# Patient Record
Sex: FEMALE | Race: WHITE | NOT HISPANIC OR LATINO | Employment: UNEMPLOYED | URBAN - METROPOLITAN AREA
[De-identification: names, ages, dates, MRNs, and addresses within clinical notes are randomized per-mention and may not be internally consistent; named-entity substitution may affect disease eponyms.]

---

## 2023-02-17 PROBLEM — G25.81 RESTLESS LEGS SYNDROME: Status: ACTIVE | Noted: 2023-02-17

## 2023-02-17 PROBLEM — G24.9 DYSKINESIA: Status: ACTIVE | Noted: 2023-02-17

## 2023-02-17 PROBLEM — G20.A1 PARKINSON DISEASE (MULTI): Status: ACTIVE | Noted: 2023-02-17

## 2023-02-17 PROBLEM — M81.0 AGE-RELATED OSTEOPOROSIS WITHOUT CURRENT PATHOLOGICAL FRACTURE: Status: ACTIVE | Noted: 2023-02-17

## 2023-02-17 PROBLEM — R11.2 NON-INTRACTABLE VOMITING WITH NAUSEA: Status: ACTIVE | Noted: 2023-02-17

## 2023-02-17 PROBLEM — R53.81 MALAISE AND FATIGUE: Status: ACTIVE | Noted: 2023-02-17

## 2023-02-17 PROBLEM — R11.0 NAUSEA IN ADULT: Status: ACTIVE | Noted: 2023-02-17

## 2023-02-17 PROBLEM — R53.83 MALAISE AND FATIGUE: Status: ACTIVE | Noted: 2023-02-17

## 2023-02-17 PROBLEM — L30.9 ECZEMA: Status: ACTIVE | Noted: 2023-02-17

## 2023-02-17 PROBLEM — G47.33 OBSTRUCTIVE SLEEP APNEA: Status: ACTIVE | Noted: 2023-02-17

## 2023-02-17 PROBLEM — M26.629 TMJ SYNDROME: Status: ACTIVE | Noted: 2023-02-17

## 2023-02-17 PROBLEM — U07.1 COVID-19 VIRUS INFECTION: Status: RESOLVED | Noted: 2023-02-17 | Resolved: 2023-02-17

## 2023-02-17 PROBLEM — R79.89 ELEVATED SERUM CREATININE: Status: ACTIVE | Noted: 2023-02-17

## 2023-02-17 PROBLEM — R49.9 UNSPECIFIED VOICE AND RESONANCE DISORDER: Status: ACTIVE | Noted: 2023-02-17

## 2023-02-17 PROBLEM — R47.1 DYSARTHRIA ON EXAMINATION: Status: ACTIVE | Noted: 2023-02-17

## 2023-02-17 PROBLEM — R05.3 CHRONIC COUGH: Status: ACTIVE | Noted: 2023-02-17

## 2023-02-17 RX ORDER — AMANTADINE HYDROCHLORIDE 100 MG/1
CAPSULE, GELATIN COATED ORAL
COMMUNITY
Start: 2020-07-08 | End: 2024-02-01 | Stop reason: WASHOUT

## 2023-02-17 RX ORDER — ROPINIROLE 8 MG/1
1 TABLET, EXTENDED RELEASE ORAL DAILY
COMMUNITY
Start: 2020-06-16 | End: 2023-04-12

## 2023-02-17 RX ORDER — CHOLECALCIFEROL (VITAMIN D3) 10(400)/ML
DROPS ORAL
COMMUNITY
Start: 2018-04-10

## 2023-02-17 RX ORDER — CALCIUM CARBONATE 300MG(750)
TABLET,CHEWABLE ORAL
COMMUNITY
Start: 2020-07-06

## 2023-02-17 RX ORDER — CARBIDOPA AND LEVODOPA 25; 100 MG/1; MG/1
TABLET ORAL
COMMUNITY
Start: 2018-04-10 | End: 2024-02-01 | Stop reason: SDUPTHER

## 2023-03-14 LAB
ALANINE AMINOTRANSFERASE (SGPT) (U/L) IN SER/PLAS: 5 U/L (ref 7–45)
ALBUMIN (G/DL) IN SER/PLAS: 4.4 G/DL (ref 3.4–5)
ALKALINE PHOSPHATASE (U/L) IN SER/PLAS: 106 U/L (ref 33–136)
ANION GAP IN SER/PLAS: 11 MMOL/L (ref 10–20)
ASPARTATE AMINOTRANSFERASE (SGOT) (U/L) IN SER/PLAS: 12 U/L (ref 9–39)
BILIRUBIN TOTAL (MG/DL) IN SER/PLAS: 0.7 MG/DL (ref 0–1.2)
CALCIUM (MG/DL) IN SER/PLAS: 10.2 MG/DL (ref 8.6–10.6)
CARBON DIOXIDE, TOTAL (MMOL/L) IN SER/PLAS: 30 MMOL/L (ref 21–32)
CHLORIDE (MMOL/L) IN SER/PLAS: 104 MMOL/L (ref 98–107)
CREATININE (MG/DL) IN SER/PLAS: 1.41 MG/DL (ref 0.5–1.05)
GFR FEMALE: 39 ML/MIN/1.73M2
GLUCOSE (MG/DL) IN SER/PLAS: 94 MG/DL (ref 74–99)
POTASSIUM (MMOL/L) IN SER/PLAS: 3.9 MMOL/L (ref 3.5–5.3)
PROTEIN TOTAL: 7.6 G/DL (ref 6.4–8.2)
SODIUM (MMOL/L) IN SER/PLAS: 141 MMOL/L (ref 136–145)
UREA NITROGEN (MG/DL) IN SER/PLAS: 24 MG/DL (ref 6–23)

## 2023-04-10 ENCOUNTER — OFFICE VISIT (OUTPATIENT)
Dept: PRIMARY CARE | Facility: CLINIC | Age: 73
End: 2023-04-10
Payer: MEDICARE

## 2023-04-10 VITALS
SYSTOLIC BLOOD PRESSURE: 130 MMHG | HEIGHT: 62 IN | HEART RATE: 82 BPM | OXYGEN SATURATION: 99 % | TEMPERATURE: 97.9 F | WEIGHT: 162 LBS | DIASTOLIC BLOOD PRESSURE: 72 MMHG | BODY MASS INDEX: 29.81 KG/M2

## 2023-04-10 DIAGNOSIS — Z12.12 SCREENING FOR COLORECTAL CANCER: ICD-10-CM

## 2023-04-10 DIAGNOSIS — N18.32 ACUTE RENAL FAILURE SUPERIMPOSED ON STAGE 3B CHRONIC KIDNEY DISEASE, UNSPECIFIED ACUTE RENAL FAILURE TYPE (MULTI): ICD-10-CM

## 2023-04-10 DIAGNOSIS — M81.0 AGE-RELATED OSTEOPOROSIS WITHOUT CURRENT PATHOLOGICAL FRACTURE: ICD-10-CM

## 2023-04-10 DIAGNOSIS — G25.81 RESTLESS LEGS SYNDROME: ICD-10-CM

## 2023-04-10 DIAGNOSIS — R79.89 ELEVATED SERUM CREATININE: ICD-10-CM

## 2023-04-10 DIAGNOSIS — R53.83 MALAISE AND FATIGUE: ICD-10-CM

## 2023-04-10 DIAGNOSIS — R11.10 DRY HEAVES: ICD-10-CM

## 2023-04-10 DIAGNOSIS — Z78.0 ASYMPTOMATIC MENOPAUSAL STATE: ICD-10-CM

## 2023-04-10 DIAGNOSIS — Z00.00 ROUTINE GENERAL MEDICAL EXAMINATION AT HEALTH CARE FACILITY: ICD-10-CM

## 2023-04-10 DIAGNOSIS — Z12.11 SCREENING FOR COLORECTAL CANCER: ICD-10-CM

## 2023-04-10 DIAGNOSIS — N17.9 ACUTE RENAL FAILURE SUPERIMPOSED ON STAGE 3B CHRONIC KIDNEY DISEASE, UNSPECIFIED ACUTE RENAL FAILURE TYPE (MULTI): ICD-10-CM

## 2023-04-10 DIAGNOSIS — G20.A1 PARKINSON DISEASE (MULTI): ICD-10-CM

## 2023-04-10 DIAGNOSIS — R53.81 MALAISE AND FATIGUE: ICD-10-CM

## 2023-04-10 DIAGNOSIS — Z00.00 MEDICARE ANNUAL WELLNESS VISIT, SUBSEQUENT: Primary | ICD-10-CM

## 2023-04-10 PROCEDURE — 1159F MED LIST DOCD IN RCRD: CPT | Performed by: FAMILY MEDICINE

## 2023-04-10 PROCEDURE — 1036F TOBACCO NON-USER: CPT | Performed by: FAMILY MEDICINE

## 2023-04-10 PROCEDURE — 99214 OFFICE O/P EST MOD 30 MIN: CPT | Performed by: FAMILY MEDICINE

## 2023-04-10 PROCEDURE — G0439 PPPS, SUBSEQ VISIT: HCPCS | Performed by: FAMILY MEDICINE

## 2023-04-10 PROCEDURE — 1160F RVW MEDS BY RX/DR IN RCRD: CPT | Performed by: FAMILY MEDICINE

## 2023-04-10 PROCEDURE — 1170F FXNL STATUS ASSESSED: CPT | Performed by: FAMILY MEDICINE

## 2023-04-10 RX ORDER — ONDANSETRON 4 MG/1
4 TABLET, FILM COATED ORAL 3 TIMES DAILY PRN
COMMUNITY
End: 2023-04-10 | Stop reason: SDUPTHER

## 2023-04-10 RX ORDER — ONDANSETRON 4 MG/1
4 TABLET, FILM COATED ORAL EVERY 12 HOURS PRN
Qty: 180 TABLET | Refills: 3 | Status: SHIPPED | OUTPATIENT
Start: 2023-04-10 | End: 2024-04-09

## 2023-04-10 ASSESSMENT — ACTIVITIES OF DAILY LIVING (ADL)
MANAGING_FINANCES: NEEDS ASSISTANCE
BATHING: NEEDS ASSISTANCE
DOING_HOUSEWORK: INDEPENDENT
DRESSING: INDEPENDENT
GROCERY_SHOPPING: INDEPENDENT
BATHING: INDEPENDENT
TAKING_MEDICATION: INDEPENDENT

## 2023-04-10 ASSESSMENT — PATIENT HEALTH QUESTIONNAIRE - PHQ9
2. FEELING DOWN, DEPRESSED OR HOPELESS: NOT AT ALL
1. LITTLE INTEREST OR PLEASURE IN DOING THINGS: NOT AT ALL
SUM OF ALL RESPONSES TO PHQ9 QUESTIONS 1 AND 2: 0

## 2023-04-10 NOTE — PROGRESS NOTES
Subjective   Reason for Visit: Nikki Person is an 72 y.o. female here for a Medicare Wellness visit.     HPI  Concern's:   Dr Amaral retired and the patient went to Dr Boswell.  Was told that there was a test to see if she truly has it.      Eyes having issues and will see somethings in vision that aren't there if in darklight.      The patient is being seen for the subsequent annual wellness visit.   Past Medical, Surgical and Family History: reviewed and updated in chart.   Interval History: Patient has not been hospitalized previously.   Medications and Supplements: Review of all medications by a prescribing practitioner or clinical pharmacist (such as prescriptions, OTCs, herbal therapies and supplements) documented in the medical record.    No, the patient is not using opioids.   Patient Self Assessment of Health Status: fair.   Tobacco use: Non-User   Alcohol use: Non-User   Illicit drug use: Non-User   Current diet: unhealthy diet, does consume adequate fluids and does not consume caffeine.   Exercise Frequency: infrequently.   Depression/Suicide Screening:  .   During the past 2 weeks, the patient has not felt down, depressed or hopeless.    During the past 2 weeks, the patient has not felt little interest or pleasure in doing things.    Hearing Impairment: none.   Cognitive Impairment: No cognitive impairment observed, patient or family reported no cognitive impairment.     Bathing: needs assistance.   Dressing: performs independently.   Walking: performs independently.   Toileting: performs independently.   Feeding: performs independently.   Personal Hygiene: performs independently.   Managing Finances: performs independently.   Shopping: performs independently.   Managing Medications: performs independently.   Housework / Basic Home Maintenance: performs independently.   Handling Transportation: dependent.   Preparing Meals: performs independently.   Using the Telephone/ Communication Devices: performs  independently.    Falls Risk Screening:. LUZ ELENA has not fallen in the last 6 months.   Fall risk factors: mobility impairment, but no polypharmacy, no sedative use, no urinary incontinence, no visual impairment, no alcohol use, no deconditioning and no cognitive impairment.   Care Plan High Risk: In addition to the low/moderate risk interventions: Low/Moderate Risk: Regular physical activity such as walking, water aerobics or rochelle chi to improve strength, balance, coordination and flexibility. Wear appropriate, sensible shoe wear. Remove fall hazards at home such as loose rugs, obstacles, use non-slip surface in bath or shower. Keep living space well lit. Use assistive devices such as cane or walker if recommended and at home use handrails on stairs, grab bars for shower or tub, raised toilet seat and seat in the shower or tub.    Advance directives:. Advanced Care Planning discussed and documented advance care plan or surrogate decision maker documented in the medical record. Patient has no living will. Patient has healthcare POA.     there are no concerns today. The patient's health since the last visit is described as good. There are no interval changes in the patient's PMH, PSH, and current medications. She has regular dental visits. She complains of vision problems. Vision care includes wearing glasses. The patient complains of blurred vision and since amantadine started. She denies hearing loss. Immunizations status: not up to date.   Lifestyle: She consumes a diverse and healthy diet. She has weight concerns. She does not exercise regularly. She does not use tobacco. She denies alcohol use.   Reproductive health: the patient is postmenopausal.   Cervical cancer screening:. patient has no history of an abnormal pap smear.   Breast cancer screening: cancer screening reviewed and updated. Screening interval recommendation: 2 years.   Colorectal Cancer Screening: Cologuard July 2020 neg. a colonoscopy was performed  within the past ten years.   Metabolic screening: lipid profile performed within the past five years.   Patient Care Team:  Dung Pineda DO as PCP - General     Review of Systems    Objective   Vitals:  There were no vitals taken for this visit.      Physical Exam  General: Patient is alert and oriented Ã--3 and appears in no acute distress. No respiratory distress.    Mouth: Normal mucosa. Moist. No erythema, exudates, tonsillar enlargement.    Neck: Normal range of motion, no masses. Thyroid is palpable and normal in size without any nodules. No anterior cervical or posterior cervical adenopathy.    Heart: Regular rate and rhythm, no murmurs clicks or gallops    Lungs: Clear to auscultation bilaterally without any rhonchi rales or wheezing, lung sounds heard throughout all lung fields    Musculoskeletal: Normal range of motion, strength is grossly intact in the proximal distal muscles of the upper and lower extremities bilaterally, deep tendon reflexes +2 out of 4 and symmetric bilaterally at the patella, Achilles, biceps, triceps, sensation intact.    Nerves: Cranial nerves II through XII appear grossly intact and without deficit    Skin: Multiple excoriation marks occurring only on the lower extremities.    Psych: Normal affect. Denies suicidal or homicidal ideations.   Assessment/Plan   Problem List Items Addressed This Visit    None    Reviewed in for the patient's past medical history, surgical history, family history, social history  We reviewed the patient's activities of daily living and possible risk for falling. Patient is stable.  Discussed preventative screenings  Vaccinations were discussed in the office. We did review the patient's status on influenza vaccination, pneumonia vaccination, tetanus vaccination, and goes vaccination  Patient was instructed to follow-up with dentist regularly and also with eye doctor regularly  We discussed advanced directives including power of , living well and  DO NOT RESUSCITATE orders    Parkinson's with Dyskinesia  - Stable and following with Dr Boswell   - Wants a second opinion because she was told that that this may not be Parkinson's.      IAIN  - No CPAP due to dry eyes  - Better sleeping on side  - Trying to loose weight    RLS- controlled  - Ropirinole ER 8 mg    Osteoporosis  - Have not had any fractures  - Suggested Matt Chi for weight bearing exercise   - Repeat Bone density  - Taking Calcium/Vit D K 2     Labs ordered

## 2023-04-10 NOTE — ADDENDUM NOTE
Addended by: SHAYLA SHELL on: 4/10/2023 02:58 PM     Modules accepted: Orders     Patient was given vistaril 50 mg po for feeling anxious with fair results obtained. Voiced complaints of nausea was given ginger ale and crackers. Will continue to monitor patient.

## 2023-04-12 DIAGNOSIS — G25.81 RESTLESS LEGS SYNDROME: Primary | ICD-10-CM

## 2023-04-12 RX ORDER — ROPINIROLE 8 MG/1
TABLET, EXTENDED RELEASE ORAL
Qty: 90 TABLET | Refills: 3 | Status: SHIPPED | OUTPATIENT
Start: 2023-04-12 | End: 2023-10-24

## 2023-04-24 ENCOUNTER — TELEPHONE (OUTPATIENT)
Dept: PRIMARY CARE | Facility: CLINIC | Age: 73
End: 2023-04-24
Payer: MEDICARE

## 2023-04-24 NOTE — TELEPHONE ENCOUNTER
Pt left message that she is having dexascan on may 1st. What supplements is she supposed to stop and for how long?

## 2023-05-22 ENCOUNTER — TELEPHONE (OUTPATIENT)
Dept: PRIMARY CARE | Facility: CLINIC | Age: 73
End: 2023-05-22
Payer: MEDICARE

## 2023-05-22 NOTE — TELEPHONE ENCOUNTER
----- Message from Dung Pineda DO sent at 5/18/2023  8:07 PM EDT -----  Please let the patient know taht the bone density was normal

## 2023-08-03 ENCOUNTER — TELEPHONE (OUTPATIENT)
Dept: PRIMARY CARE | Facility: CLINIC | Age: 73
End: 2023-08-03
Payer: MEDICARE

## 2023-08-03 LAB — NONINV COLON CA DNA+OCC BLD SCRN STL QL: NEGATIVE

## 2023-08-03 NOTE — TELEPHONE ENCOUNTER
----- Message from Dung Pineda DO sent at 8/3/2023  6:39 AM EDT -----  Let patient know that cologuard was neg

## 2023-09-22 ENCOUNTER — LAB (OUTPATIENT)
Dept: LAB | Facility: LAB | Age: 73
End: 2023-09-22
Payer: MEDICARE

## 2023-09-22 DIAGNOSIS — R53.81 MALAISE AND FATIGUE: ICD-10-CM

## 2023-09-22 DIAGNOSIS — M81.0 AGE-RELATED OSTEOPOROSIS WITHOUT CURRENT PATHOLOGICAL FRACTURE: ICD-10-CM

## 2023-09-22 DIAGNOSIS — R53.83 MALAISE AND FATIGUE: ICD-10-CM

## 2023-09-22 LAB
ALANINE AMINOTRANSFERASE (SGPT) (U/L) IN SER/PLAS: 4 U/L (ref 7–45)
ALBUMIN (G/DL) IN SER/PLAS: 4.5 G/DL (ref 3.4–5)
ALKALINE PHOSPHATASE (U/L) IN SER/PLAS: 116 U/L (ref 33–136)
ANION GAP IN SER/PLAS: 13 MMOL/L (ref 10–20)
ASPARTATE AMINOTRANSFERASE (SGOT) (U/L) IN SER/PLAS: 12 U/L (ref 9–39)
BASOPHILS (10*3/UL) IN BLOOD BY AUTOMATED COUNT: 0.07 X10E9/L (ref 0–0.1)
BASOPHILS/100 LEUKOCYTES IN BLOOD BY AUTOMATED COUNT: 1.2 % (ref 0–2)
BILIRUBIN TOTAL (MG/DL) IN SER/PLAS: 0.7 MG/DL (ref 0–1.2)
CALCIUM (MG/DL) IN SER/PLAS: 10 MG/DL (ref 8.6–10.3)
CARBON DIOXIDE, TOTAL (MMOL/L) IN SER/PLAS: 28 MMOL/L (ref 21–32)
CHLORIDE (MMOL/L) IN SER/PLAS: 104 MMOL/L (ref 98–107)
COBALAMIN (VITAMIN B12) (PG/ML) IN SER/PLAS: 357 PG/ML (ref 211–911)
CREATININE (MG/DL) IN SER/PLAS: 1.41 MG/DL (ref 0.5–1.05)
EOSINOPHILS (10*3/UL) IN BLOOD BY AUTOMATED COUNT: 0.19 X10E9/L (ref 0–0.4)
EOSINOPHILS/100 LEUKOCYTES IN BLOOD BY AUTOMATED COUNT: 3.3 % (ref 0–6)
ERYTHROCYTE DISTRIBUTION WIDTH (RATIO) BY AUTOMATED COUNT: 13.3 % (ref 11.5–14.5)
ERYTHROCYTE MEAN CORPUSCULAR HEMOGLOBIN CONCENTRATION (G/DL) BY AUTOMATED: 31.4 G/DL (ref 32–36)
ERYTHROCYTE MEAN CORPUSCULAR VOLUME (FL) BY AUTOMATED COUNT: 91 FL (ref 80–100)
ERYTHROCYTES (10*6/UL) IN BLOOD BY AUTOMATED COUNT: 4.67 X10E12/L (ref 4–5.2)
GFR FEMALE: 39 ML/MIN/1.73M2
GLUCOSE (MG/DL) IN SER/PLAS: 104 MG/DL (ref 74–99)
HEMATOCRIT (%) IN BLOOD BY AUTOMATED COUNT: 42.7 % (ref 36–46)
HEMOGLOBIN (G/DL) IN BLOOD: 13.4 G/DL (ref 12–16)
IMMATURE GRANULOCYTES/100 LEUKOCYTES IN BLOOD BY AUTOMATED COUNT: 0.2 % (ref 0–0.9)
LEUKOCYTES (10*3/UL) IN BLOOD BY AUTOMATED COUNT: 5.7 X10E9/L (ref 4.4–11.3)
LYMPHOCYTES (10*3/UL) IN BLOOD BY AUTOMATED COUNT: 1.49 X10E9/L (ref 0.8–3)
LYMPHOCYTES/100 LEUKOCYTES IN BLOOD BY AUTOMATED COUNT: 26.1 % (ref 13–44)
MONOCYTES (10*3/UL) IN BLOOD BY AUTOMATED COUNT: 0.47 X10E9/L (ref 0.05–0.8)
MONOCYTES/100 LEUKOCYTES IN BLOOD BY AUTOMATED COUNT: 8.2 % (ref 2–10)
NEUTROPHILS (10*3/UL) IN BLOOD BY AUTOMATED COUNT: 3.47 X10E9/L (ref 1.6–5.5)
NEUTROPHILS/100 LEUKOCYTES IN BLOOD BY AUTOMATED COUNT: 61 % (ref 40–80)
PLATELETS (10*3/UL) IN BLOOD AUTOMATED COUNT: 294 X10E9/L (ref 150–450)
POTASSIUM (MMOL/L) IN SER/PLAS: 3.9 MMOL/L (ref 3.5–5.3)
PROTEIN TOTAL: 7.2 G/DL (ref 6.4–8.2)
SODIUM (MMOL/L) IN SER/PLAS: 141 MMOL/L (ref 136–145)
THYROTROPIN (MIU/L) IN SER/PLAS BY DETECTION LIMIT <= 0.05 MIU/L: 2.09 MIU/L (ref 0.44–3.98)
THYROXINE (T4) FREE (NG/DL) IN SER/PLAS: 0.87 NG/DL (ref 0.61–1.12)
UREA NITROGEN (MG/DL) IN SER/PLAS: 27 MG/DL (ref 6–23)

## 2023-09-22 PROCEDURE — 84439 ASSAY OF FREE THYROXINE: CPT

## 2023-09-22 PROCEDURE — 84481 FREE ASSAY (FT-3): CPT

## 2023-09-22 PROCEDURE — 85025 COMPLETE CBC W/AUTO DIFF WBC: CPT

## 2023-09-22 PROCEDURE — 80053 COMPREHEN METABOLIC PANEL: CPT

## 2023-09-22 PROCEDURE — 82607 VITAMIN B-12: CPT

## 2023-09-22 PROCEDURE — 84443 ASSAY THYROID STIM HORMONE: CPT

## 2023-09-22 PROCEDURE — 82652 VIT D 1 25-DIHYDROXY: CPT

## 2023-09-22 PROCEDURE — 36415 COLL VENOUS BLD VENIPUNCTURE: CPT

## 2023-09-23 LAB — TRIIODOTHYRONINE (T3) FREE (PG/ML) IN SER/PLAS: 3.2 PG/ML (ref 2.3–4.2)

## 2023-09-25 LAB — VITAMIN D 1,25-DIHYDROXY: 46.5 PG/ML (ref 19.9–79.3)

## 2023-09-30 PROBLEM — R11.10 DRY HEAVES: Status: ACTIVE | Noted: 2023-09-30

## 2023-09-30 PROBLEM — G47.69 SLEEP-RELATED MOVEMENT DISORDER: Status: ACTIVE | Noted: 2023-09-30

## 2023-09-30 PROBLEM — Z74.09 IMPAIRED FUNCTIONAL MOBILITY, BALANCE, GAIT, AND ENDURANCE: Status: ACTIVE | Noted: 2023-09-30

## 2023-09-30 RX ORDER — ROPINIROLE 2 MG/1
8 TABLET, FILM COATED ORAL NIGHTLY
COMMUNITY
End: 2023-10-09 | Stop reason: ALTCHOICE

## 2023-09-30 RX ORDER — QUETIAPINE FUMARATE 25 MG/1
0.5 TABLET, FILM COATED ORAL EVERY EVENING
COMMUNITY
Start: 2023-09-21 | End: 2024-02-01 | Stop reason: WASHOUT

## 2023-10-02 ENCOUNTER — TELEPHONE (OUTPATIENT)
Dept: NEUROLOGY | Facility: CLINIC | Age: 73
End: 2023-10-02
Payer: MEDICARE

## 2023-10-02 NOTE — TELEPHONE ENCOUNTER
Patient calling stating she has decided not to take Seroquel because of all the bad possible side effects, states she knows her hallucinations are not really there.    Please advise

## 2023-10-03 ENCOUNTER — APPOINTMENT (OUTPATIENT)
Dept: PHYSICAL THERAPY | Facility: CLINIC | Age: 73
End: 2023-10-03
Payer: MEDICARE

## 2023-10-09 ENCOUNTER — OFFICE VISIT (OUTPATIENT)
Dept: PRIMARY CARE | Facility: CLINIC | Age: 73
End: 2023-10-09
Payer: MEDICARE

## 2023-10-09 ENCOUNTER — TELEPHONE (OUTPATIENT)
Dept: PRIMARY CARE | Facility: CLINIC | Age: 73
End: 2023-10-09

## 2023-10-09 VITALS
SYSTOLIC BLOOD PRESSURE: 126 MMHG | TEMPERATURE: 97.8 F | BODY MASS INDEX: 28.71 KG/M2 | HEIGHT: 62 IN | DIASTOLIC BLOOD PRESSURE: 88 MMHG | WEIGHT: 156 LBS

## 2023-10-09 DIAGNOSIS — R35.0 URINARY FREQUENCY: ICD-10-CM

## 2023-10-09 DIAGNOSIS — G25.81 RESTLESS LEGS SYNDROME: ICD-10-CM

## 2023-10-09 DIAGNOSIS — M81.0 AGE-RELATED OSTEOPOROSIS WITHOUT CURRENT PATHOLOGICAL FRACTURE: ICD-10-CM

## 2023-10-09 DIAGNOSIS — Z00.00 ANNUAL PHYSICAL EXAM: Primary | ICD-10-CM

## 2023-10-09 DIAGNOSIS — N18.32 STAGE 3B CHRONIC KIDNEY DISEASE (MULTI): ICD-10-CM

## 2023-10-09 DIAGNOSIS — G47.33 OBSTRUCTIVE SLEEP APNEA: ICD-10-CM

## 2023-10-09 DIAGNOSIS — G20.B2 PARKINSON'S DISEASE WITH DYSKINESIA AND FLUCTUATING MANIFESTATIONS (MULTI): ICD-10-CM

## 2023-10-09 LAB
POC ALBUMIN /CREATININE RATIO MANUALLY ENTERED: ABNORMAL UG/MG CREAT
POC APPEARANCE, URINE: ABNORMAL
POC BILIRUBIN, URINE: NEGATIVE
POC BLOOD, URINE: NEGATIVE
POC COLOR, URINE: YELLOW
POC GLUCOSE, URINE: NEGATIVE MG/DL
POC KETONES, URINE: ABNORMAL MG/DL
POC LEUKOCYTES, URINE: ABNORMAL
POC NITRITE,URINE: NEGATIVE
POC PH, URINE: 6.5 PH
POC PROTEIN, URINE: NEGATIVE MG/DL
POC SPECIFIC GRAVITY, URINE: >=1.03
POC URINE ALBUMIN: 150 MG/L
POC URINE CREATININE: 300 MG/DL
POC UROBILINOGEN, URINE: 0.2 EU/DL

## 2023-10-09 PROCEDURE — 82044 UR ALBUMIN SEMIQUANTITATIVE: CPT | Performed by: FAMILY MEDICINE

## 2023-10-09 PROCEDURE — 87086 URINE CULTURE/COLONY COUNT: CPT

## 2023-10-09 PROCEDURE — 99397 PER PM REEVAL EST PAT 65+ YR: CPT | Performed by: FAMILY MEDICINE

## 2023-10-09 PROCEDURE — 99214 OFFICE O/P EST MOD 30 MIN: CPT | Performed by: FAMILY MEDICINE

## 2023-10-09 PROCEDURE — 1160F RVW MEDS BY RX/DR IN RCRD: CPT | Performed by: FAMILY MEDICINE

## 2023-10-09 PROCEDURE — 1036F TOBACCO NON-USER: CPT | Performed by: FAMILY MEDICINE

## 2023-10-09 PROCEDURE — 81002 URINALYSIS NONAUTO W/O SCOPE: CPT | Performed by: FAMILY MEDICINE

## 2023-10-09 PROCEDURE — 1159F MED LIST DOCD IN RCRD: CPT | Performed by: FAMILY MEDICINE

## 2023-10-09 ASSESSMENT — PATIENT HEALTH QUESTIONNAIRE - PHQ9
1. LITTLE INTEREST OR PLEASURE IN DOING THINGS: NOT AT ALL
SUM OF ALL RESPONSES TO PHQ9 QUESTIONS 1 AND 2: 0
2. FEELING DOWN, DEPRESSED OR HOPELESS: NOT AT ALL

## 2023-10-09 NOTE — TELEPHONE ENCOUNTER
Patient is on requip 8 mg daily for restless legs. It does not seem to be working now. What do you suggest?

## 2023-10-09 NOTE — PROGRESS NOTES
"Subjective   Reason for Visit: Nikki Person is an 73 y.o. female here for a Medicare Wellness visit.     HPI  Concern's:   Dr Page placed on Seroquel for auditory and visual hallucinations.  She is worried about side effects.     Has fallen 3-4 times.  Has injured the left knee.  She fell 3-4 weeks ago.  Having it not heal well.  She has a bump but denies redness, bleeding, pain is 2-3/10.      The patient's health since the last visit is described as good. There are no interval changes in the patient's PMH, PSH, and current medications. She has regular dental visits. She complains of vision problems. Vision care includes wearing glasses.  She denies hearing loss. Immunizations status: not up to date and refuses vaccinations.   Lifestyle: She consumes a diverse and healthy diet. She has weight concerns. And is losing.  Jhas decreased portion sizes.  She does not exercise regularly. She does not use tobacco. She denies alcohol use.   Reproductive health: the patient is postmenopausal.   Cervical cancer screening:. patient has no history of an abnormal pap smear.   Breast cancer screenin2022 normal. Screening interval recommendation: 2 years.   Colorectal Cancer Screening: Cologuard 2023 negMetabolic screening: lipid profile performed within the past five years.   Patient Care Team:  Dung Pineda DO as PCP - General  Dung Pineda DO as PCP - [employer] PCP     Review of Systems    Objective   Vitals:  /88   Temp 36.6 °C (97.8 °F)   Ht 1.575 m (5' 2\")   Wt 70.8 kg (156 lb)   BMI 28.53 kg/m²       Physical Exam  General: Patient is alert and oriented Ã--3 and appears in no acute distress. No respiratory distress.    Mouth: Normal mucosa. Moist. No erythema, exudates, tonsillar enlargement.    Neck: Normal range of motion, no masses. Thyroid is palpable and normal in size without any nodules. No anterior cervical or posterior cervical adenopathy.    Heart: Regular rate and " rhythm, no murmurs clicks or gallops    Lungs: Clear to auscultation bilaterally without any rhonchi rales or wheezing, lung sounds heard throughout all lung fields    Musculoskeletal: Normal range of motion, strength is grossly intact in the proximal distal muscles of the upper and lower extremities bilaterally, deep tendon reflexes +2 out of 4 and symmetric bilaterally at the patella, Achilles, biceps, triceps, sensation intact.    Nerves: Cranial nerves II through XII appear grossly intact and without deficit    Skin: Multiple excoriation marks occurring only on the lower extremities.    Psych: Normal affect. Denies suicidal or homicidal ideations.   Assessment/Plan   Problem List Items Addressed This Visit       Age-related osteoporosis without current pathological fracture    Relevant Orders    CBC and Auto Differential    Comprehensive Metabolic Panel    Lipid Panel    Vitamin B12    Obstructive sleep apnea    Parkinson disease    Restless legs syndrome    Relevant Orders    CBC and Auto Differential    Comprehensive Metabolic Panel    Lipid Panel    Vitamin B12     Other Visit Diagnoses       Annual physical exam    -  Primary    Stage 3b chronic kidney disease (CMS/HCC)        Relevant Orders    POCT Albumin random urine manually resulted    CBC and Auto Differential    Comprehensive Metabolic Panel    Lipid Panel    Vitamin B12          Reviewed in for the patient's past medical history, surgical history, family history, social history  We reviewed the patient's activities of daily living and possible risk for falling. Patient is stable.  Discussed preventative screenings  Vaccinations were discussed in the office. We did review the patient's status on influenza vaccination, pneumonia vaccination, tetanus vaccination, and goes vaccination  Patient was instructed to follow-up with dentist regularly and also with eye doctor regularly  We discussed advanced directives including power of , living well  and DO NOT RESUSCITATE orders    Parkinson's with Dyskinesia  - Stable and following with Dr Page   - Discussed starting the seroquel for the hallucinations  -  Starting PT again    IAIN  - No CPAP due to dry eyes  - Better sleeping on side  - Trying to loose weight    RLS- controlled  - Ropirinole ER 8 mg    Osteoporosis  - Have not had any fractures  - Suggested Matt Chi for weight bearing exercise   - Repeat Bone density  - Taking Calcium/Vit D K 2    CKD stage 3B  - Urine microalbumin done in the office and was abnormal with small amount of albumin noted  - US kidneys ordered  -Discussed possible medication to help out with the kidneys      Labs ordered

## 2023-10-10 LAB — BACTERIA UR CULT: NORMAL

## 2023-10-11 DIAGNOSIS — G25.81 RESTLESS LEGS SYNDROME: Primary | ICD-10-CM

## 2023-10-11 DIAGNOSIS — D50.9 IRON DEFICIENCY ANEMIA, UNSPECIFIED IRON DEFICIENCY ANEMIA TYPE: ICD-10-CM

## 2023-10-12 ENCOUNTER — EVALUATION (OUTPATIENT)
Dept: PHYSICAL THERAPY | Facility: HOSPITAL | Age: 73
End: 2023-10-12
Payer: MEDICARE

## 2023-10-12 DIAGNOSIS — G20.A1 PARKINSON'S DISEASE (MULTI): ICD-10-CM

## 2023-10-12 DIAGNOSIS — R29.6 FALLS FREQUENTLY: Primary | ICD-10-CM

## 2023-10-12 PROCEDURE — 97162 PT EVAL MOD COMPLEX 30 MIN: CPT | Mod: GP

## 2023-10-12 ASSESSMENT — ENCOUNTER SYMPTOMS
DEPRESSION: 0
OCCASIONAL FEELINGS OF UNSTEADINESS: 1
LOSS OF SENSATION IN FEET: 0

## 2023-10-12 ASSESSMENT — PAIN SCALES - GENERAL: PAINLEVEL_OUTOF10: 0 - NO PAIN

## 2023-10-12 ASSESSMENT — PATIENT HEALTH QUESTIONNAIRE - PHQ9
SUM OF ALL RESPONSES TO PHQ9 QUESTIONS 1 AND 2: 0
2. FEELING DOWN, DEPRESSED OR HOPELESS: NOT AT ALL
1. LITTLE INTEREST OR PLEASURE IN DOING THINGS: NOT AT ALL

## 2023-10-12 ASSESSMENT — PAIN - FUNCTIONAL ASSESSMENT: PAIN_FUNCTIONAL_ASSESSMENT: 0-10

## 2023-10-12 NOTE — PROGRESS NOTES
Physical Therapy    Physical Therapy Evaluation and Treatment      Patient Name: Nikki Person  MRN: [id number]  Today's Date: 10/12/2023  Time Calculation  Start Time: 1345  Stop Time: 1445  Time Calculation (min): 60 min        PT Assessment Results: Impaired balance, Decreased endurance, Decreased mobility, Decreased coordination, Decreased safety awareness  Rehab Prognosis: Good  Evaluation/Treatment Tolerance: Patient tolerated treatment well      Current Problem:   1. Falls frequently  Follow Up In Physical Therapy      2. Parkinson's disease  Referral to Physical Therapy    Follow Up In Physical Therapy          Subjective    General:  General  Reason for Referral: Hx of falls, impaired mobility  Referred By: Camilo  Past Medical History Relevant to Rehab: PD, Dyskinesia  Pt dx with Parkinson's in 2008.  Pt is a 74 yo female with h/o parkinson, demonstrating dyskinesia. Pt states that she didn't have dyskinesia until until a week ago, states  that Dr Page wanted her to discontinue her current PD medications because of mild hallucinations.  Pt reports that she went off for one day resulting in a significant increase in involuntary movements, has improved since going back on the meds but not back to where it was prior to change.    Precautions:  Precautions  STEADI Fall Risk Score (The score of 4 or more indicates an increased risk of falling): 6       Pain:  Pain Assessment  Pain Assessment: 0-10  Pain Score: 0 - No pain     Prior Level of Function:     Ambulated independently without assistive device  Objective   Sensation:      Intact and symmetrical to light touch in LE's    Strength:  LE strength grossly 4+/5 in LE's  ROM:     LE's WFL's      Gait:   Pt ambulates with modified independence using tri-wheeled walker, slow pace, decreased step length and foot clearance- scuffing-shuffled steps  Outcome Measures:  Finish Cronin Next visit    Treatments:  EXERCISES Date: 10/12 Date  Date Date   VISIT# #1 # # #     REPS REPS REPS REPS          Nu Step              RB Calf Stretch              Parallel Bars:         Lateral          Retro         March         Big-Fast walk               Cane step fwd/back(lunge )        Alt toe taps on step                                                                                                                               Cronin or Mini Best next      HEP Issue next         OP EDUCATION:  Education  Individual(s) Educated: Patient  Education Provided: POC, Fall Risk  Risk and Benefits Discussed with Patient/Caregiver/Other: yes  Patient/Caregiver Demonstrated Understanding: yes  Plan of Care Discussed and Agreed Upon: yes  Patient Response to Education: Patient/Caregiver Verbalized Understanding of Information  Education on POC, LSVT and goals with treatment of PD    Assessment:  PT Assessment  PT Assessment Results: Impaired balance, Decreased endurance, Decreased mobility, Decreased coordination, Decreased safety awareness  Rehab Prognosis: Good  Evaluation/Treatment Tolerance: Patient tolerated treatment well  Plan:  OP PT Plan  Treatment/Interventions: Education/ Instruction, Gait training, Neuromuscular re-education, Therapeutic activities, Therapeutic exercises  PT Plan: Skilled PT  PT Frequency: 2 times per week  Duration: 6 weeks  Onset Date: 09/22/23  Rehab Potential: Good  Plan of Care Agreement: Patient     Goals:  Active       Impaired Mobility       STG- HEP       Start:  10/16/23    Expected End:  11/06/23       Pt will demonstrate independence with HEP to reinforce gains made in treatment          STG-Balance       Start:  10/16/23    Expected End:  11/06/23       Finish Cronin  Balance Assessment and set appropriate balance goals         LTG- Gait       Start:  10/16/23    Expected End:  11/27/23       Pt will ambulate using wheeled walker with increased pace, requiring minimal vc's for foot clearance on hard level surfaces x150' for safely entering/exiting appointments          PT Goal 4       Start:  10/16/23

## 2023-10-12 NOTE — LETTER
October 16, 2023     Patient: Nikki Person   YOB: 1950   Date of Visit: 10/12/2023       To Whom it May Concern:    Nikki Person was seen in my clinic on 10/12/2023. She {Return to school/sport:59531}.    If you have any questions or concerns, please don't hesitate to call.         Sincerely,          Lori Ambriz, PT        CC: No Recipients

## 2023-10-12 NOTE — LETTER
October 16, 2023     Patient: Nikki Person   YOB: 1950   Date of Visit: 10/12/2023       To Whom It May Concern:    It is my medical opinion that Nikki Person {Work release (duty restriction):20660}.    If you have any questions or concerns, please don't hesitate to call.         Sincerely,        Lori Ambriz, PT    CC: No Recipients

## 2023-10-16 PROBLEM — R29.6 FALLS FREQUENTLY: Status: RESOLVED | Noted: 2023-10-12 | Resolved: 2023-10-16

## 2023-10-17 ENCOUNTER — TREATMENT (OUTPATIENT)
Dept: PHYSICAL THERAPY | Facility: HOSPITAL | Age: 73
End: 2023-10-17
Payer: MEDICARE

## 2023-10-17 DIAGNOSIS — R29.6 FALLS FREQUENTLY: Primary | ICD-10-CM

## 2023-10-17 DIAGNOSIS — G20.A1 PARKINSON'S DISEASE (MULTI): ICD-10-CM

## 2023-10-17 PROCEDURE — 97110 THERAPEUTIC EXERCISES: CPT | Mod: GP,CQ

## 2023-10-17 ASSESSMENT — PAIN - FUNCTIONAL ASSESSMENT: PAIN_FUNCTIONAL_ASSESSMENT: 0-10

## 2023-10-17 ASSESSMENT — PAIN SCALES - GENERAL: PAINLEVEL_OUTOF10: 0 - NO PAIN

## 2023-10-17 NOTE — PROGRESS NOTES
"Physical Therapy    Physical Therapy Treatment    Patient Name: Nikki Person  MRN: [id number]  Today's Date: 10/17/2023  Time Calculation  Start Time: 0215  Stop Time: 0300  Time Calculation (min): 45 min  Visit #2    Current Problem  1. Parkinson's disease  Follow Up In Physical Therapy      2. Falls frequently  Follow Up In Physical Therapy          Subjective:  Subjective   Patient reported she occasionally has right shoulder pain but it is not bothering her today. She noted no falls. Pt. Noted she normally only uses assistive device when at home alone.       Precautions  Precautions  Precautions Comment: fall risk     Pain  Pain Assessment: 0-10  Pain Score: 0 - No pain    Objective    Pt. Ambulated into clinic without AD  Outcome Measures:   Cronin mild balance impairment      Treatments:    EXERCISES Date: 10/12 Date  Date Date   VISIT# #2 # # #    REPS REPS REPS REPS    8' l1      Nu Step              RB Calf Stretch 30\"x3             Parallel Bars:         Lateral  2 laps        Retro 2 laps        March         Big-Fast walk 2 laps              Cane step fwd/back(lunge )        Alt toe taps on step                                                                                                                              Cronin completed          Assessment: Pt. Needed cues to slow down on NuStep. Patient completed with jerking motion, stopped at 8' due to fatigue. During Cronin testing, pt completed step taps at accelerated rate that was unsafe, multiple cues given and SBA to slow down motion. Verbal cues for big steps with big walk but to slow down for safety. Pt needed SBA and cues to lessen speed for retro walking.       Plan:   Improve balance.    OP EDUCATION:       Goals:  Active       Impaired Mobility       STG- HEP       Start:  10/16/23    Expected End:  23       Pt will demonstrate independence with HEP to reinforce gains made in treatment          STG-Balance       Start:  10/16/23    " Expected End:  11/06/23       Finish Cronin  Balance Assessment and set appropriate balance goals         LTG- Gait       Start:  10/16/23    Expected End:  11/27/23       Pt will ambulate using wheeled walker with increased pace, requiring minimal vc's for foot clearance on hard level surfaces x150' for safely entering/exiting appointments         PT Goal 4       Start:  10/16/23

## 2023-10-23 ENCOUNTER — DOCUMENTATION (OUTPATIENT)
Dept: PHYSICAL THERAPY | Facility: HOSPITAL | Age: 73
End: 2023-10-23
Payer: MEDICARE

## 2023-10-23 ENCOUNTER — APPOINTMENT (OUTPATIENT)
Dept: PHYSICAL THERAPY | Facility: HOSPITAL | Age: 73
End: 2023-10-23
Payer: MEDICARE

## 2023-10-24 ENCOUNTER — DOCUMENTATION (OUTPATIENT)
Dept: PHYSICAL THERAPY | Facility: HOSPITAL | Age: 73
End: 2023-10-24

## 2023-10-24 ENCOUNTER — OFFICE VISIT (OUTPATIENT)
Dept: PRIMARY CARE | Facility: CLINIC | Age: 73
End: 2023-10-24
Payer: MEDICARE

## 2023-10-24 VITALS
SYSTOLIC BLOOD PRESSURE: 138 MMHG | WEIGHT: 156 LBS | HEIGHT: 62 IN | DIASTOLIC BLOOD PRESSURE: 82 MMHG | RESPIRATION RATE: 16 BRPM | BODY MASS INDEX: 28.71 KG/M2 | HEART RATE: 81 BPM | OXYGEN SATURATION: 96 %

## 2023-10-24 DIAGNOSIS — M54.6 DORSALGIA OF THORACIC REGION: Primary | ICD-10-CM

## 2023-10-24 DIAGNOSIS — M25.511 CHRONIC RIGHT SHOULDER PAIN: ICD-10-CM

## 2023-10-24 DIAGNOSIS — R07.81 RIB PAIN ON LEFT SIDE: ICD-10-CM
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
## 2023-10-24 DIAGNOSIS — G89.29 CHRONIC RIGHT SHOULDER PAIN: ICD-10-CM

## 2023-10-24 DIAGNOSIS — G20.B2 PARKINSON'S DISEASE WITH DYSKINESIA AND FLUCTUATING MANIFESTATIONS (MULTI): ICD-10-CM

## 2023-10-24 DIAGNOSIS — G25.81 RESTLESS LEGS SYNDROME: ICD-10-CM

## 2023-10-24 PROCEDURE — 1160F RVW MEDS BY RX/DR IN RCRD: CPT | Performed by: FAMILY MEDICINE

## 2023-10-24 PROCEDURE — 1036F TOBACCO NON-USER: CPT | Performed by: FAMILY MEDICINE

## 2023-10-24 PROCEDURE — 1159F MED LIST DOCD IN RCRD: CPT | Performed by: FAMILY MEDICINE

## 2023-10-24 PROCEDURE — 1126F AMNT PAIN NOTED NONE PRSNT: CPT | Performed by: FAMILY MEDICINE

## 2023-10-24 PROCEDURE — 99214 OFFICE O/P EST MOD 30 MIN: CPT | Performed by: FAMILY MEDICINE

## 2023-10-24 RX ORDER — PRAMIPEXOLE 1.5 MG/1
1.5 TABLET, EXTENDED RELEASE ORAL NIGHTLY
Qty: 30 TABLET | Refills: 3 | Status: SHIPPED | OUTPATIENT
Start: 2023-10-24 | End: 2024-02-01 | Stop reason: WASHOUT

## 2023-10-24 RX ORDER — NAPROXEN 500 MG/1
500 TABLET ORAL 2 TIMES DAILY PRN
Qty: 60 TABLET | Refills: 5 | Status: SHIPPED | OUTPATIENT
Start: 2023-10-24 | End: 2024-06-20

## 2023-10-24 RX ORDER — CYCLOBENZAPRINE HCL 10 MG
10 TABLET ORAL 3 TIMES DAILY PRN
Qty: 30 TABLET | Refills: 0 | Status: SHIPPED | OUTPATIENT
Start: 2023-10-24 | End: 2023-10-27 | Stop reason: SDUPTHER

## 2023-10-24 NOTE — PROGRESS NOTES
Physical Therapy                 Therapy Communication Note    Patient Name: Nikki Person  MRN: [id number]  Today's Date: 10/24/2023     Discipline: Physical Therapy    Missed Visit Reason:      Missed Time: Cancel    Comment: Pt. fell Friday evening and doesn't think she can complete exercises.

## 2023-10-24 NOTE — PROGRESS NOTES
Subjective   Patient ID: Nikki Person is a 73 y.o. female who presents for Fall.  LUIS ARMANDO Noble has 3 falls.  She fell in the bathroom this Friday.  She hit her left side. She had a couple bruises but did not feel like much.  Sunday she fell when she went to get her walker.  She has had spasms in the back since and is in pain.  She is taking naproxen and did not get any relief yet.  Slept in the recliner.        Review of Systems    Objective   Physical Exam  General: The patient is alert and oriented and appears in some pain distress    Neck: Decreased range of motion and but no adenopathy    Heart: Regular rate and rhythm no murmurs clicks or gallops    Lungs: Clear to auscultation bilateral without rhonchi rales or wheezing    Musculoskeletal: Strength is grossly intact throughout    Patient has bruising along the left breast and left ribs  Assessment/Plan   Problem List Items Addressed This Visit       Parkinson's disease    Relevant Medications    pramipexole (Mirapex ER) 1.5 mg tablet extended release 24 hr    Restless legs syndrome    Relevant Medications    pramipexole (Mirapex ER) 1.5 mg tablet extended release 24 hr     Other Visit Diagnoses       Dorsalgia of thoracic region    -  Primary    Relevant Medications    naproxen (Naprosyn) 500 mg tablet    cyclobenzaprine (Flexeril) 10 mg tablet    Fall, initial encounter        Relevant Medications    naproxen (Naprosyn) 500 mg tablet    cyclobenzaprine (Flexeril) 10 mg tablet    Chronic right shoulder pain

## 2023-10-24 NOTE — PROGRESS NOTES
Physical Therapy                 Therapy Communication Note    Patient Name: Nikki Person  MRN: [id number]  Today's Date: 10/24/2023     Discipline: Physical Therapy    Comment:  Pt reports that she had a fall, requesting to be placed on hold.

## 2023-10-25 ENCOUNTER — APPOINTMENT (OUTPATIENT)
Dept: PHYSICAL THERAPY | Facility: HOSPITAL | Age: 73
End: 2023-10-25
Payer: MEDICARE

## 2023-10-26 ENCOUNTER — TELEPHONE (OUTPATIENT)
Dept: PRIMARY CARE | Facility: CLINIC | Age: 73
End: 2023-10-26
Payer: MEDICARE

## 2023-10-26 ENCOUNTER — HOSPITAL ENCOUNTER (OUTPATIENT)
Dept: RADIOLOGY | Facility: HOSPITAL | Age: 73
Discharge: HOME | End: 2023-10-26
Payer: MEDICARE

## 2023-10-26 DIAGNOSIS — R07.9 CHEST PAIN, UNSPECIFIED TYPE: ICD-10-CM

## 2023-10-26 PROCEDURE — 72072 X-RAY EXAM THORAC SPINE 3VWS: CPT | Performed by: RADIOLOGY

## 2023-10-26 PROCEDURE — 72072 X-RAY EXAM THORAC SPINE 3VWS: CPT | Mod: FY

## 2023-10-26 NOTE — TELEPHONE ENCOUNTER
order   Hydroquinone Pregnancy And Lactation Text: This medication has not been assigned a Pregnancy Risk Category but animal studies failed to show danger with the topical medication. It is unknown if the medication is excreted in breast milk.

## 2023-10-30 ENCOUNTER — APPOINTMENT (OUTPATIENT)
Dept: PHYSICAL THERAPY | Facility: HOSPITAL | Age: 73
End: 2023-10-30
Payer: MEDICARE

## 2023-10-30 ENCOUNTER — TELEPHONE (OUTPATIENT)
Dept: PRIMARY CARE | Facility: CLINIC | Age: 73
End: 2023-10-30
Payer: MEDICARE

## 2023-10-30 NOTE — TELEPHONE ENCOUNTER
----- Message from Dung Pineda DO sent at 10/29/2023  9:53 PM EDT -----  Please let the patient know taht there were no fractures

## 2023-11-01 ENCOUNTER — APPOINTMENT (OUTPATIENT)
Dept: PHYSICAL THERAPY | Facility: HOSPITAL | Age: 73
End: 2023-11-01
Payer: MEDICARE

## 2023-11-02 ENCOUNTER — TELEPHONE (OUTPATIENT)
Dept: PRIMARY CARE | Facility: CLINIC | Age: 73
End: 2023-11-02
Payer: MEDICARE

## 2023-11-02 NOTE — TELEPHONE ENCOUNTER
Patient called about medicine for restless legs. Said she is having a lot of difficulty tolerating it. Feels exhausted and a lot of brain fog. Please advise, she said she did start requip

## 2023-11-16 ENCOUNTER — TREATMENT (OUTPATIENT)
Dept: PHYSICAL THERAPY | Facility: HOSPITAL | Age: 73
End: 2023-11-16
Payer: MEDICARE

## 2023-11-16 DIAGNOSIS — R29.6 FALLS FREQUENTLY: ICD-10-CM

## 2023-11-16 DIAGNOSIS — G20.B1 PARKINSON'S DISEASE WITH DYSKINESIA, UNSPECIFIED WHETHER MANIFESTATIONS FLUCTUATE (MULTI): Primary | ICD-10-CM

## 2023-11-16 DIAGNOSIS — G20.A1 PARKINSON'S DISEASE (MULTI): ICD-10-CM

## 2023-11-16 PROCEDURE — 97110 THERAPEUTIC EXERCISES: CPT | Mod: GP

## 2023-11-16 PROCEDURE — 97530 THERAPEUTIC ACTIVITIES: CPT | Mod: GP

## 2023-11-16 ASSESSMENT — BALANCE ASSESSMENTS
TRANSFERS: ABLE TO TRANSFER SAFELY WITH MINOR USE OF HANDS
STANDING UNSUPPORTED: ABLE TO STAND SAFELY FOR 2 MINUTES
PICK UP OBJECT FROM THE FLOOR FROM A STANDING POSITION: ABLE TO PICK UP SLIPPER BUT NEEDS SUPERVISION
SITTING WITH BACK UNSUPPORTED BUT FEET SUPPORTED ON FLOOR OR ON A STOOL: ABLE TO SIT SAFELY AND SECURELY FOR 2 MINUTES
STANDING ON ONE LEG: ABLE TO LIFT LEG INDEPENDENTLY AND HOLD GREATER THAN OR EQUAL TO 3 SECONDS
PLACE ALTERNATE FOOT ON STEP OR STOOL WHILE STANDING UNSUPPORTED: TURNS SIDEWAYS ONLY BUT MAINTAINS BALANCE
TURN 360 DEGREES: ABLE TO TURN 360 DEGREES SAFELY BUT SLOWLY
STANDING UNSUPPORTED WITH FEET TOGETHER: ABLE TO PLACE FEET TOGETHER INDEPENDENTLY AND STAND 1 MINUTE SAFELY
STANDING UNSUPPORTED WITH EYES CLOSED: ABLE TO STAND 10 SECONDS SAFELY
PLACE ALTERNATE FOOT ON STEP OR STOOL WHILE STANDING UNSUPPORTED: ABLE TO STAND INDEPENDENTLY AND SAFELY AND COMPLETE 8 STEPS IN 20 SECONDS
STANDING TO SITTING: ABLE TO STAND INDEPENDENTLY USING HANDS
STANDING TO SITTING: SITS SAFELY WITH MINIMAL USE OF HANDS
STANDING UNSUPPORTED ONE FOOT IN FRONT: ABLE TO TAKE SMALL STEP INDEPENDENTLY AND HOLD 30 SECONDS
LONG VERSION TOTAL SCORE (MAX 56): 44
REACHING FORWARD WITH OUTSTRETCHED ARM WHILE STANDING: CAN REACH FORWARD 5 CM (2 INCHES)

## 2023-11-16 ASSESSMENT — PAIN SCALES - GENERAL: PAINLEVEL_OUTOF10: 0 - NO PAIN

## 2023-11-16 ASSESSMENT — PAIN - FUNCTIONAL ASSESSMENT: PAIN_FUNCTIONAL_ASSESSMENT: 0-10

## 2023-11-16 NOTE — PROGRESS NOTES
Physical Therapy    Physical Therapy Treatment    Patient Name: Nikki Person  MRN: [id number]  : 1950   Today's Date: 2023     Visit #3      Current Problem  1. Parkinson's disease  Follow Up In Physical Therapy    Follow Up In Physical Therapy      2. Falls frequently  Follow Up In Physical Therapy    Follow Up In Physical Therapy            Subjective      Pt reports that she has had multiple falls since her initial evaluation, states that the worst was when she was turning, bruised herself but denies other injuries or pain currently    Precautions    Precautions  STEADI Fall Risk Score (The score of 4 or more indicates an increased risk of falling): 6       Pain  Pain Assessment: 0-10  Pain Score: 0 - No pain    Objective      Education on slowing down and being careful when maneuvering, turning and switching tasks.  Frequent vc's for safety awareness  Outcome Measures:  Cronin Balance Scale  1. Sitting to Standing: Able to stand independently using hands  2. Standing Unsupported: Able to stand safely for 2 minutes  3. Sitting with Back Unsupported but Feet Supported on Floor or on a Stool: Able to sit safely and securely for 2 minutes  4. Standing to Sitting: Sits safely with minimal use of hands  5.  Transfers: Able to transfer safely with minor use of hands  6. Standing Unsupported with Eyes Closed: Able to stand 10 seconds safely  7. Standing Unsupported with Feet Together: Able to place feet together independently and stand 1 minute safely  8. Reach Forward with Outstretched Arm While Standing: Can reach forward 5 cm (2 inches)  9.  Object from Floor from a Standing Position: Able to  slipper but needs supervision  10. Turning to Look Behind Over Left and Right Shoulders While Standing: Turns sideways only but maintains balance  11. Turn 360 Degrees: Able to turn 360 degrees safely but slowly  12. Place Alternate Foot on Step or Stool While Standing Unsupported: Able to stand  "independently and safely and complete 8 steps in 20 seconds (when paying attention)  13. Standing Unsupported One Foot in Front: Able to take small step independently and hold 30 seconds  14. Standing on One Leg: Able to lift leg independently and hold greater than or equal to 3 seconds  Cronin Balance Score: 44    Mini-BEST Balance Evaluation Systems Test  Anticipatory sit to stand: 2  Anticipatory rise to toes: 1  Anticipatory standing: Right  Anticipatory standing lowest score: 1  RPC Compensatory stepping correction-forward: 2  RPC Compensatory stepping correction-backward: 2  Compensatory stepping correction lateral: Left  Compensatory stepping correction lateral lowest score: 2  Sensory orientation stance (feet together); eyes open, firm surface : 2  Sensory orientation stance (feet together); eyes closed, foam surface: 2  DG Change in gait speed: 2  DG Walk with head turns-horizontal: 1  DG Walk with pivot turns: 2  DG Step over obstacles: 1  Time up & Go with dual task: 2  Sensory Orientation incline eyes closed: 2  Total: 24/28    Treatments:  EXERCISES Date: 10/12 Date 10/17 Date Date   VISIT# #1 #2 #3 #    REPS REPS REPS REPS          Nu Step  L1 x 8' L1 x 10'           RB Calf Stretch  3 x 30' 3 x 30\"           Parallel Bars:         Lateral   2 laps       Retro  2 laps       March         Big-Fast walk  2 laps             Cane step fwd/back(lunge )        Alt toe taps on step                                                                                                                               Cronin or Mini Best next Cronin 44 Cronin 44  Mini Best: 24/28    HEP Issue next        Assessment:     Pt does well with balance activities when concentrating/ paying attention to tasks.  When performing functional activities, pt changes direction and turns head/ body quickly.  Frequent vc's for safety with transfers and mobility    Plan:     Progress with functional/ dual task training to prevent additional " falls         Goals:  Active       Impaired Mobility       STG- HEP       Start:  10/16/23    Expected End:  11/06/23       Pt will demonstrate independence with HEP to reinforce gains made in treatment          STG-Balance       Start:  10/16/23    Expected End:  11/06/23       Finish Cronin  Balance Assessment and set appropriate balance goals         LTG- Gait       Start:  10/16/23    Expected End:  11/27/23       Pt will ambulate using wheeled walker with increased pace, requiring minimal vc's for foot clearance on hard level surfaces x150' for safely entering/exiting appointments         PT Goal 4       Start:  10/16/23

## 2023-11-27 ENCOUNTER — TELEPHONE (OUTPATIENT)
Dept: PRIMARY CARE | Facility: CLINIC | Age: 73
End: 2023-11-27
Payer: MEDICARE

## 2023-11-27 NOTE — TELEPHONE ENCOUNTER
Chief Complaint:    Symptoms: dry cough     Duration: few weeks     Treatments: cough drops    Patient Requesting: Recommendation     Did the patient have their Covid Vaccine?    Pharmacy: [facility], [place]

## 2023-11-28 ENCOUNTER — TELEPHONE (OUTPATIENT)
Dept: PRIMARY CARE | Facility: CLINIC | Age: 73
End: 2023-11-28

## 2023-11-28 ENCOUNTER — TELEMEDICINE (OUTPATIENT)
Dept: PRIMARY CARE | Facility: CLINIC | Age: 73
End: 2023-11-28
Payer: MEDICARE

## 2023-11-28 DIAGNOSIS — R09.82 POSTNASAL DRIP: Primary | ICD-10-CM

## 2023-11-28 DIAGNOSIS — R05.1 ACUTE COUGH: ICD-10-CM

## 2023-11-28 PROCEDURE — 99442 PR PHYS/QHP TELEPHONE EVALUATION 11-20 MIN: CPT | Performed by: FAMILY MEDICINE

## 2023-11-28 RX ORDER — AZITHROMYCIN 250 MG/1
TABLET, FILM COATED ORAL
Qty: 6 TABLET | Refills: 0 | Status: SHIPPED | OUTPATIENT
Start: 2023-11-28 | End: 2023-12-03

## 2023-11-28 RX ORDER — BENZONATATE 200 MG/1
200 CAPSULE ORAL 3 TIMES DAILY PRN
Qty: 30 CAPSULE | Refills: 0 | Status: SHIPPED | OUTPATIENT
Start: 2023-11-28 | End: 2023-12-08

## 2023-11-28 RX ORDER — AZITHROMYCIN 250 MG/1
TABLET, FILM COATED ORAL
Qty: 6 TABLET | Refills: 0 | Status: SHIPPED | OUTPATIENT
Start: 2023-11-28 | End: 2023-11-28 | Stop reason: SDUPTHER

## 2023-11-28 RX ORDER — BENZONATATE 200 MG/1
200 CAPSULE ORAL 3 TIMES DAILY PRN
Qty: 30 CAPSULE | Refills: 0 | Status: SHIPPED | OUTPATIENT
Start: 2023-11-28 | End: 2023-11-28 | Stop reason: SDUPTHER

## 2023-12-06 ENCOUNTER — TREATMENT (OUTPATIENT)
Dept: PHYSICAL THERAPY | Facility: HOSPITAL | Age: 73
End: 2023-12-06
Payer: MEDICARE

## 2023-12-06 DIAGNOSIS — G20.A1 PARKINSON'S DISEASE WITHOUT DYSKINESIA OR FLUCTUATING MANIFESTATIONS (MULTI): ICD-10-CM

## 2023-12-06 DIAGNOSIS — R29.6 FALLS FREQUENTLY: Primary | ICD-10-CM

## 2023-12-06 PROCEDURE — 97110 THERAPEUTIC EXERCISES: CPT | Mod: GP,CQ

## 2023-12-06 PROCEDURE — 97530 THERAPEUTIC ACTIVITIES: CPT | Mod: GP,CQ

## 2023-12-06 ASSESSMENT — PAIN - FUNCTIONAL ASSESSMENT: PAIN_FUNCTIONAL_ASSESSMENT: 0-10

## 2023-12-06 ASSESSMENT — PAIN SCALES - GENERAL: PAINLEVEL_OUTOF10: 0 - NO PAIN

## 2023-12-06 ASSESSMENT — PAIN DESCRIPTION - DESCRIPTORS: DESCRIPTORS: ACHING;SORE

## 2023-12-06 NOTE — PROGRESS NOTES
"Physical Therapy    Physical Therapy Treatment    Patient Name: Nikki Person  MRN: [id number]  Today's Date: 12/6/2023  Time Calculation  Start Time: 1040  Stop Time: 1118  Time Calculation (min): 38 min  VISIT# 4      Current Problem  1. Falls frequently  Follow Up In Physical Therapy      2. Parkinson's disease without dyskinesia or fluctuating manifestations  Follow Up In Physical Therapy            Subjective   Pt reports her shoulder is bothering her today the normal soreness      Precautions  Precautions  Precautions Comment: fall risk       Pain  Pain Assessment: 0-10  Pain Score: 0 - No pain  Pain Location: Shoulder  Pain Orientation: Right  Pain Descriptors: Aching, Sore    Objective   Increased balance activities       Treatments:  EXERCISES Date: 10/12 Date 10/17 Date Date 12/6/2023      VISIT# #1 #2 #3 #4     REPS REPS REPS REPS            Nu Step  L1 x 8' L1 x 10' 10 @L2            RB Calf Stretch  3 x 30' 3 x 30\" 30\" x 3            Parallel Bars:          Lateral   2 laps  3 laps 0-1 UE      Retro  2 laps  3 laps 1 UE      March    3 laps 1 UE       tandem    3 laps 1 UE      Big-Fast walk  2 laps               Cane step fwd/back(lunge )    next    Half turns     next     Alt toe taps on step    5\" 10 x 2            Standing cone reaches multi  Surfaces     8 cones  x 2 rounds             Cronin or Mini Best next Cronin 44 Cronin 44  Mini Best: 24/28     HEP Issue next           Assessment:   Pt needs cues to slow down with balance activities.      Plan: Progress dual tasks to decrease fall risks   OP PT Plan  Treatment/Interventions: Education/ Instruction, Gait training, Neuromuscular re-education, Therapeutic activities, Therapeutic exercises        Goals:  Active       Impaired Mobility       STG- HEP       Start:  10/16/23    Expected End:  11/06/23       Pt will demonstrate independence with HEP to reinforce gains made in treatment          STG-Balance       Start:  10/16/23    Expected End:  11/06/23  "      Finish Cronin  Balance Assessment and set appropriate balance goals         LTG- Gait       Start:  10/16/23    Expected End:  11/27/23       Pt will ambulate using wheeled walker with increased pace, requiring minimal vc's for foot clearance on hard level surfaces x150' for safely entering/exiting appointments         PT Goal 4       Start:  10/16/23

## 2023-12-11 ENCOUNTER — TREATMENT (OUTPATIENT)
Dept: PHYSICAL THERAPY | Facility: HOSPITAL | Age: 73
End: 2023-12-11
Payer: MEDICARE

## 2023-12-11 DIAGNOSIS — R29.6 FALLS FREQUENTLY: ICD-10-CM

## 2023-12-11 DIAGNOSIS — G20.A1 PARKINSON'S DISEASE WITHOUT DYSKINESIA OR FLUCTUATING MANIFESTATIONS (MULTI): ICD-10-CM

## 2023-12-11 PROCEDURE — 97112 NEUROMUSCULAR REEDUCATION: CPT | Mod: GP

## 2023-12-11 ASSESSMENT — PAIN SCALES - GENERAL: PAINLEVEL_OUTOF10: 0 - NO PAIN

## 2023-12-11 ASSESSMENT — PAIN - FUNCTIONAL ASSESSMENT: PAIN_FUNCTIONAL_ASSESSMENT: 0-10

## 2023-12-11 NOTE — PROGRESS NOTES
"Physical Therapy    Physical Therapy Treatment    Patient Name: Nikki Person  MRN: [id number]  : 1950   Today's Date: 2023  Time Calculation  Start Time: 1402 (Late)  Visit #5  Insurance:          Current Problem  1. Parkinson's disease without dyskinesia or fluctuating manifestations  Follow Up In Physical Therapy      2. Falls frequently  Follow Up In Physical Therapy            Subjective   Pt reports that she almost didn't come today, her  isn't feeling well. Pt denies pain upon arrival and no falls since last visit.       Precautions  Precautions  STEADI Fall Risk Score (The score of 4 or more indicates an increased risk of falling): 6  Precautions Comment: fall risk       Pain  Pain Assessment: 0-10  Pain Score: 0 - No pain  Pain Location: Shoulder  Pain Orientation: Right    Objective            Treatments:  EXERCISES Date 10/17 Date Date    VISIT# #2 #3 #4 5    REPS REPS REPS           Nu Step L1 x 8' L1 x 10' 10 @L2 5' @ L@          RB Calf Stretch 3 x 30' 3 x 30\" 30\" x 3           Parallel Bars:         Lateral  2 laps  3 laps 0-1 UE      Retro 2 laps  3 laps 1 UE      March   3 laps 1 UE       tandem   3 laps 1 UE      Big-Fast walk 2 laps              Cane step fwd/back(lunge )  x next 2 x 10 ea   Half turns    next     Alt toe taps on step   5\" 10 x 2           Standing cone reaches multi  Surfaces    8 cones  x 2 rounds    Cone reach across midline standing on foam    X10 ea   Alt toe taps on foam    2 x 10   Lateral cones step over    2 laps      Cronin 44 Cronin 44  Mini Best:      HEP         Assessment:Pt tolerated treatment without complaint of increase in symptoms other than fatigue, responding to treatment with improvement in balance, progressing with dual task activities          Plan:     Progress with dual task activities and distractions with balance activities         Goals:  Active       Impaired Mobility       STG- HEP       Start:  10/16/23    Expected " End:  11/06/23       Pt will demonstrate independence with HEP to reinforce gains made in treatment          STG-Balance       Start:  10/16/23    Expected End:  11/06/23       Finish Cronin  Balance Assessment and set appropriate balance goals         LTG- Gait       Start:  10/16/23    Expected End:  11/27/23       Pt will ambulate using wheeled walker with increased pace, requiring minimal vc's for foot clearance on hard level surfaces x150' for safely entering/exiting appointments         PT Goal 4       Start:  10/16/23                 .

## 2023-12-18 ENCOUNTER — TREATMENT (OUTPATIENT)
Dept: PHYSICAL THERAPY | Facility: HOSPITAL | Age: 73
End: 2023-12-18
Payer: MEDICARE

## 2023-12-18 DIAGNOSIS — G20.A1 PARKINSON'S DISEASE WITHOUT DYSKINESIA OR FLUCTUATING MANIFESTATIONS (MULTI): ICD-10-CM

## 2023-12-18 DIAGNOSIS — R29.6 FALLS FREQUENTLY: ICD-10-CM

## 2023-12-18 PROCEDURE — 97112 NEUROMUSCULAR REEDUCATION: CPT | Mod: GP

## 2023-12-18 PROCEDURE — 97110 THERAPEUTIC EXERCISES: CPT | Mod: GP

## 2023-12-18 ASSESSMENT — PAIN - FUNCTIONAL ASSESSMENT: PAIN_FUNCTIONAL_ASSESSMENT: 0-10

## 2023-12-18 ASSESSMENT — PAIN SCALES - GENERAL: PAINLEVEL_OUTOF10: 0 - NO PAIN

## 2023-12-18 NOTE — PROGRESS NOTES
"Physical Therapy    Physical Therapy Treatment    Patient Name: Nikki Person  MRN: [id number]  : 1950   Today's Date: 2023     Visit #6  Insurance:          Current Problem  1. Parkinson's disease without dyskinesia or fluctuating manifestations  Follow Up In Physical Therapy      2. Falls frequently  Follow Up In Physical Therapy            Subjective   Pt reports no new complaints, reports having LOB but no falls since last visit       Precautions  Precautions  STEADI Fall Risk Score (The score of 4 or more indicates an increased risk of falling): 6  Precautions Comment: fall risk       Pain  Pain Assessment: 0-10  Pain Score: 0 - No pain    Objective      Initiation of 4 square  and multi-directional/ diagonal stepping and forward cone tap -step over    Treatments:  EXERCISES Date Date 2023   VISIT# #3 #4 5 6    REPS REPS            Nu Step L1 x 10' 10 @L2 5' @ L@ 10'@L3          RB Calf Stretch 3 x 30\" 30\" x 3            Parallel Bars:         Lateral   3 laps 0-1 UE       Retro  3 laps 1 UE       March  3 laps 1 UE        tandem  3 laps 1 UE       Big-Fast walk               Cane step fwd/back(lunge ) x next 2 x10 0-1 UE 2 x 10 0-UE   Half turns   next      Alt toe taps on step  5\" 10 x 2 2 x 10 x6' on foam 2 x 10 x 6\"on foam          Standing cone reaches multi  Surfaces   8 cones  x 2 rounds     Cone reach across midline standing on foam   2 x 10 ea way    Fwd cone -tap over    2 laps   Lateral cones step over   2 laps   2 laps   4 square - Cw, CCW, diag    X5 ea    Cronin 44  Mini Best:    Sit to stand   HEP         Assessment:  Pt tolerated treatment without complaint of increase in symptoms, responding to treatment with decreased use of UE support or LOB, progressing with balance training and dual task activities       Plan:   discharge next visit         Goals:  Active       Impaired Mobility       STG- HEP (Met)       Start:  10/16/23    Expected End:  23    " Resolved:  12/18/23    Pt will demonstrate independence with HEP to reinforce gains made in treatment          STG-Balance (Met)       Start:  10/16/23    Expected End:  11/06/23    Resolved:  12/18/23    Finish Cronin  Balance Assessment and set appropriate balance goals         LTG- Gait       Start:  10/16/23    Expected End:  11/27/23       Pt will ambulate using wheeled walker with increased pace, requiring minimal vc's for foot clearance on hard level surfaces x150' for safely entering/exiting appointments         PT Goal 4       Start:  10/16/23                 .

## 2023-12-27 ENCOUNTER — TREATMENT (OUTPATIENT)
Dept: PHYSICAL THERAPY | Facility: HOSPITAL | Age: 73
End: 2023-12-27
Payer: MEDICARE

## 2023-12-27 DIAGNOSIS — R29.6 FALLS FREQUENTLY: Primary | ICD-10-CM

## 2023-12-27 DIAGNOSIS — G20.A1 PARKINSON'S DISEASE WITHOUT DYSKINESIA OR FLUCTUATING MANIFESTATIONS (MULTI): ICD-10-CM

## 2023-12-27 PROCEDURE — 97530 THERAPEUTIC ACTIVITIES: CPT | Mod: GP | Performed by: PHYSICAL THERAPIST

## 2023-12-27 PROCEDURE — 97112 NEUROMUSCULAR REEDUCATION: CPT | Mod: GP | Performed by: PHYSICAL THERAPIST

## 2023-12-27 PROCEDURE — 97110 THERAPEUTIC EXERCISES: CPT | Mod: GP | Performed by: PHYSICAL THERAPIST

## 2023-12-27 ASSESSMENT — BALANCE ASSESSMENTS
LONG VERSION TOTAL SCORE (MAX 56): 49
STANDING UNSUPPORTED ONE FOOT IN FRONT: NEEDS HELP TO STEP BUT CAN HOLD 15 SECONDS
PLACE ALTERNATE FOOT ON STEP OR STOOL WHILE STANDING UNSUPPORTED: ABLE TO STAND INDEPENDENTLY AND SAFELY AND COMPLETE 8 STEPS IN 20 SECONDS
STANDING TO SITTING: SITS SAFELY WITH MINIMAL USE OF HANDS
STANDING ON ONE LEG: TRIES TO LIFT LEG UNABLE TO HOLD 3 SECONDS BUT REMAINS STANDING INDEPENDENTLY
STANDING TO SITTING: ABLE TO STAND WITHOUT USING HANDS AND STABILIZE INDEPENDENTLY
STANDING UNSUPPORTED WITH EYES CLOSED: ABLE TO STAND 10 SECONDS SAFELY
PLACE ALTERNATE FOOT ON STEP OR STOOL WHILE STANDING UNSUPPORTED: LOOKS BEHIND FROM BOTH SIDES AND WEIGHT SHIFTS WELL
REACHING FORWARD WITH OUTSTRETCHED ARM WHILE STANDING: CAN REACH FORWARD CONFIDENTLY 25 CM (10 INCHES)
TURN 360 DEGREES: ABLE TO TURN 360 DEGREES SAFELY IN 4 SECONDS OR LESS
SITTING WITH BACK UNSUPPORTED BUT FEET SUPPORTED ON FLOOR OR ON A STOOL: ABLE TO SIT SAFELY AND SECURELY FOR 2 MINUTES
STANDING UNSUPPORTED: ABLE TO STAND SAFELY FOR 2 MINUTES
TRANSFERS: ABLE TO TRANSFER SAFELY WITH MINOR USE OF HANDS
PICK UP OBJECT FROM THE FLOOR FROM A STANDING POSITION: ABLE TO PICK UP SLIPPER BUT NEEDS SUPERVISION
STANDING UNSUPPORTED WITH FEET TOGETHER: ABLE TO PLACE FEET TOGETHER INDEPENDENTLY AND STAND 1 MINUTE SAFELY

## 2023-12-27 ASSESSMENT — PAIN - FUNCTIONAL ASSESSMENT: PAIN_FUNCTIONAL_ASSESSMENT: 0-10

## 2023-12-27 ASSESSMENT — ENCOUNTER SYMPTOMS
DEPRESSION: 0
LOSS OF SENSATION IN FEET: 0
OCCASIONAL FEELINGS OF UNSTEADINESS: 1

## 2023-12-27 ASSESSMENT — PAIN SCALES - GENERAL: PAINLEVEL_OUTOF10: 0 - NO PAIN

## 2023-12-27 NOTE — PROGRESS NOTES
Physical Therapy    Physical Therapy Treatment    Patient Name: Nikki Person  MRN: [id number]  : 1950   Today's Date: 2023  Time Calculation  Start Time: 1115  Stop Time: 1155  Time Calculation (min): 40 min  Visit Number: 7  Auth Dates: N/A    Current Problem  Problem List Items Addressed This Visit             ICD-10-CM    Parkinson's disease G20.A1    Falls frequently - Primary R29.6        Subjective   Patient reports that she fell on [address] when she was turning around and lost her balance. She bruised her right arm and knee. Overall she is not sure if she has made improvements or not. She did not go to the hospital or her doctor to be inspected.     Precautions  Precautions  STEADI Fall Risk Score (The score of 4 or more indicates an increased risk of falling): 8  Precautions Comment: fall risk    Pain  Pain Assessment: 0-10  Pain Score: 0 - No pain  Pain Location: Shoulder  Pain Orientation: Right  Pain Frequency:  (With use/pulling)    Objective   Patient ambulates into clinic with Rollator walker with relatively smooth gait pattern without indications of LOB x 100+ feet, and negotiated same distance out of clinic without supervision.    Bruising on right forearm and right knee locally with mild edema.      Outcome Measures:  Cronin Balance Scale  1. Sitting to Standing: Able to stand without using hands and stabilize independently  2. Standing Unsupported: Able to stand safely for 2 minutes  3. Sitting with Back Unsupported but Feet Supported on Floor or on a Stool: Able to sit safely and securely for 2 minutes  4. Standing to Sitting: Sits safely with minimal use of hands  5.  Transfers: Able to transfer safely with minor use of hands  6. Standing Unsupported with Eyes Closed: Able to stand 10 seconds safely  7. Standing Unsupported with Feet Together: Able to place feet together independently and stand 1 minute safely  8. Reach Forward with Outstretched Arm While Standing: Can reach  "forward confidently 25 cm (10 inches)  9.  Object from Floor from a Standing Position: Able to  slipper but needs supervision  10. Turning to Look Behind Over Left and Right Shoulders While Standing: Looks behind from both sides and weight shifts well  11. Turn 360 Degrees: Able to turn 360 degrees safely in 4 seconds or less  12. Place Alternate Foot on Step or Stool While Standing Unsupported: Able to stand independently and safely and complete 8 steps in 20 seconds (LOB if loss of focus)  13. Standing Unsupported One Foot in Front: Needs help to step but can hold 15 seconds  14. Standing on One Leg: Tries to lift leg unable to hold 3 seconds but remains standing independently  Penn Balance Score: 49    Treatments:    EXERCISES Date 12/6/2023 12/11 12/18 12/27/2023   VISIT# #3 #4 #5 #6 #7    REPS REPS      PENN     Completed = 49   Nu Step L1 x 10' 10 @L2 5' @ L@ 10'@L3 10' @L3           RB Calf Stretch 3 x 30\" 30\" x 3              Parallel Bars:          Lateral   3 laps 0-1 UE        Retro  3 laps 1 UE        March  3 laps 1 UE         tandem  3 laps 1 UE        Big-Fast walk                 Cane step fwd/back(lunge ) x next 2 x10 0-1 UE 2 x 10 0-UE    Half turns   next       Alt toe taps on step  5\" 10 x 2 2 x 10 x6' on foam 2 x 10 x 6\"on foam            Standing cone reaches multi  Surfaces   8 cones  x 2 rounds      Cone reach across midline standing on foam   2 x 10 ea way     Fwd cone -tap over    2 laps    Lateral cones step over   2 laps   2 laps    4 square - Cw, CCW, diag    X5 ea     Penn 44  Mini Best: 24/28   Sit to stand    HEP     See below     Access Code: [email or web address]  URL: [email or web address]  Date: 12/27/2023  Prepared by: Antonio De La Cruz    Exercises  - Standing Hip Abduction with Counter Support  - 1 x daily - 2 sets - 10 reps - 1 sec hold  - Standing Hip Abduction with Counter Support (Mirrored)  - 1 x daily - 2 sets - 10 reps - 1 sec hold  - Standing " March with Counter Support  - 1 x daily - 2 sets - 10 reps - 1 sec hold  - Wall Fall  - 1 x daily - 1 sets - 10 reps    Therapeutic Activity  15'    Neuromuscular Re-education   10'     Therapeutic Exercises  15'    Assessment:   Patient continues to be a fall risk, but made improvements in the PENN scale to 49 (improved 5 points from 11/16/23). Patient acknowledges need for her attention to tasks and slowing pace of movements for transfers and transitions, but dismisses applying this knowledge. She also admits that HEP compliance is variable depending on what she has going on at the time.     Plan:   Patient is requesting discharge at this time. She notes she will try to do her HEP. She plans to follow up with her neurologist in January.    Goals:  Resolved       Impaired Mobility       STG- HEP (Met)       Start:  10/16/23    Expected End:  11/06/23    Resolved:  12/18/23    Pt will demonstrate independence with HEP to reinforce gains made in treatment          STG-Balance (Met)       Start:  10/16/23    Expected End:  11/06/23    Resolved:  12/18/23    Finish Penn  Balance Assessment and set appropriate balance goals         LTG- Gait (Met)       Start:  10/16/23    Expected End:  11/27/23    Resolved:  12/27/23    Pt will ambulate using wheeled walker with increased pace, requiring minimal vc's for foot clearance on hard level surfaces x150' for safely entering/exiting appointments

## 2024-01-08 ENCOUNTER — APPOINTMENT (OUTPATIENT)
Dept: NEUROLOGY | Facility: CLINIC | Age: 74
End: 2024-01-08
Payer: MEDICARE

## 2024-02-01 ENCOUNTER — OFFICE VISIT (OUTPATIENT)
Dept: NEUROLOGY | Facility: CLINIC | Age: 74
End: 2024-02-01
Payer: MEDICARE

## 2024-02-01 VITALS
WEIGHT: 166.8 LBS | HEART RATE: 81 BPM | BODY MASS INDEX: 30.51 KG/M2 | SYSTOLIC BLOOD PRESSURE: 143 MMHG | DIASTOLIC BLOOD PRESSURE: 85 MMHG

## 2024-02-01 DIAGNOSIS — G20.B1 PARKINSON'S DISEASE WITH DYSKINESIA WITHOUT FLUCTUATING MANIFESTATIONS (MULTI): Primary | ICD-10-CM

## 2024-02-01 PROCEDURE — 1160F RVW MEDS BY RX/DR IN RCRD: CPT | Performed by: PSYCHIATRY & NEUROLOGY

## 2024-02-01 PROCEDURE — 1126F AMNT PAIN NOTED NONE PRSNT: CPT | Performed by: PSYCHIATRY & NEUROLOGY

## 2024-02-01 PROCEDURE — G2211 COMPLEX E/M VISIT ADD ON: HCPCS | Performed by: PSYCHIATRY & NEUROLOGY

## 2024-02-01 PROCEDURE — 1159F MED LIST DOCD IN RCRD: CPT | Performed by: PSYCHIATRY & NEUROLOGY

## 2024-02-01 PROCEDURE — 1036F TOBACCO NON-USER: CPT | Performed by: PSYCHIATRY & NEUROLOGY

## 2024-02-01 PROCEDURE — 99214 OFFICE O/P EST MOD 30 MIN: CPT | Performed by: PSYCHIATRY & NEUROLOGY

## 2024-02-01 RX ORDER — CARBIDOPA AND LEVODOPA 25; 100 MG/1; MG/1
TABLET ORAL
Qty: 720 TABLET | Refills: 3 | Status: SHIPPED | OUTPATIENT
Start: 2024-02-01

## 2024-02-01 RX ORDER — ROPINIROLE 8 MG/1
8 TABLET, EXTENDED RELEASE ORAL NIGHTLY
COMMUNITY
End: 2024-02-12

## 2024-02-01 NOTE — PROGRESS NOTES
Subjective     Nikki Person is a 73 y.o. year old female here for Parkinson's disease follow-up.    HPI  Patient was lost to follow-up.  She was last seen 10 months ago.  She is post to follow-up in 4 months from her last visit.  Dyskinesias are mild.    She has had hallucinations, sees a person in periphery and then looks and not there.   She said she could not make her last appointment due to her  having covid.   No dizziness.   Has trouble sleeping, wake up to urinate 6 times at night.   At that time amantadine was reduced to see if hallucinations improve.  She has fallen.   Her symptoms began in chest on 5 with slow movements, bradykinesia, balance issues and small handwriting.  She was seen by several neurologists in the past.  She saw Dr. Devries, Dr. Person, Dr. Kaye Amaral.    Patient currently is taking Sinemet 25/100 mg 2 tabs 4 times daily, ropinirole ER 8 mg daily for restless leg and amantadine 100 mg BID ( she did not remember what I told her last time to change things).    In the past she did try to change her Sinemet to 1.5 tabs 4 times a day but did not feel that it helped as much.  When being put on amantadine twice daily she had no dyskinesia anymore.  Review of Systems    Patient Active Problem List   Diagnosis    Age-related osteoporosis without current pathological fracture    Chronic cough    Dysarthria on examination    Dyskinesia    Eczema    Elevated serum creatinine    Malaise and fatigue    Nausea in adult    Non-intractable vomiting with nausea    Obstructive sleep apnea    Parkinson's disease    Restless legs syndrome    TMJ syndrome    Unspecified voice and resonance disorder    Medicare annual wellness visit, subsequent    Asymptomatic menopausal state    Screening for colorectal cancer    Routine general medical examination at [facility]    Acute renal failure superimposed on stage 3b chronic kidney disease (CMS/[facility])    Dry heaves    Sleep-related movement disorder     Impaired functional mobility, balance, gait, and endurance    Falls frequently     Past Medical History:   Diagnosis Date    Other nondisplaced fracture of upper end of left humerus, sequela 2020    Other closed nondisplaced fracture of proximal end of left humerus, sequela    Personal history of other infectious and parasitic diseases 2019    History of scabies    Somnolence 2020    Daytime somnolence     Past Surgical History:   Procedure Laterality Date    OTHER SURGICAL HISTORY  2019    Cholecystectomy    OTHER SURGICAL HISTORY  2019    Hysterectomy     Social History     Tobacco Use    Smoking status: Former     Types: Cigarettes     Quit date:      Years since quittin.1    Smokeless tobacco: Never   Substance Use Topics    Alcohol use: Never     family history includes Lymphoma in her father; malignant melanoma of skin in her sister; renal cell carcinoma in her mother.    Current Outpatient Medications:     amantadine (Symmetrel) 100 mg capsule, Take by mouth., Disp: , Rfl:     calcium carb/magnesium ox,carb (DORIS-MAG ORAL), Take by mouth., Disp: , Rfl:     carbidopa-levodopa (Sinemet)  mg tablet, Take by mouth., Disp: , Rfl:     cholecalciferol (Vitamin D-3) 10 mcg/mL (400 unit/mL) drops, Take by mouth., Disp: , Rfl:     cyclobenzaprine (Flexeril) 10 mg tablet, Take 1 tablet (10 mg) by mouth 3 times a day as needed for muscle spasms., Disp: 90 tablet, Rfl: 1    enzymes,digestive (DIGESTIVE ENZYMES ORAL), Take as directed, Disp: , Rfl:     lipase-protease-amylase 9,000-112,500- 112,500 unit capsule, Take by mouth., Disp: , Rfl:     magnesium oxide (Mag-Ox) 400 mg tablet, Take by mouth., Disp: , Rfl:     naproxen (Naprosyn) 500 mg tablet, Take 1 tablet (500 mg) by mouth 2 times a day as needed for mild pain (1 - 3) (pain)., Disp: 60 tablet, Rfl: 5    ondansetron (Zofran) 4 mg tablet, Take 1 tablet (4 mg) by mouth every 12 hours if needed for nausea., Disp: 180  tablet, Rfl: 3    pramipexole (Mirapex ER) 1.5 mg tablet extended release 24 hr, Take 1.5 mg by mouth once daily at bedtime., Disp: 30 tablet, Rfl: 3    QUEtiapine (SEROquel) 25 mg tablet, Take 0.5 tablets (12.5 mg) by mouth once daily in the evening. if not effectve, take 0.5 tab BID, Disp: , Rfl:     TURMERIC ORAL, Take by mouth., Disp: , Rfl:   Allergies   Allergen Reactions    Acetaminophen Unknown and Hives    Sulfamethoxazole-Trimethoprim Unknown and Hives     There were no vitals taken for this visit.  Neurological Exam/Physical Exam:    Constitutional: General appearance: no acute distress. Pleasant.  Hypomimia, and dyskinesia of head   Auscultation of Heart: Regular rate and rhythm, no murmurs, normal S1 and S2.   Carotid Arteries: no bruits  Peripheral Vascular Exam: No swelling, edema or tenderness to palpation in extremities  Mental status: no distress, alert, interactive and cooperative. Affect is appropriate.   Orientation: oriented to person, oriented to place and oriented to time.   Memory: recent memory intact and remote memory intact.   Attention: normal attention /concentration.   Language: normal comprehension and naming.   Fund of knowledge: Patient displays adequate knowledge of current events.  Eyes: The ophthalmoscopic examination was normal.   Cranial nerve II: Visual fields full to confrontation.   Cranial nerves III, IV, and VI: Pupils round, equally reactive to light; EOMs intact. No nystagmus.   Cranial Nerve V: Facial sensation intact to LT bilaterally.   Cranial nerve VII: no facial droop  Cranial nerve VIII: Hearing is intact  Cranial nerves IX and X: Palate elevates symmetrically.   Cranial nerve XI: Shoulder shrug intact.   Cranial nerve XII: Tongue is midline.  Motor:  Muscle bulk was normal in both upper and lower extremities.    No atrophy.   Strength is normal.  Right hand tremor seen. Head dyskinesia mild, more so on the right side, mild bradykinesia.   Deep Tendon Reflexes:  left biceps 2+ , right biceps 2+, left triceps 2+, right triceps 2+, left brachioradialis 2+, right brachioradialis 2+, left patella 2+, right patella 2+, left ankle jerk 2+, right ankle jerk 2+   Plantar Reflex: Toes downgoing to plantar stimulation on the left. Toes downgoing to plantar stimulation on the right.   Sensory Exam: Normal to vibratory sensation  Coordination:  no limb dystaxia and rapid alternating movements are intact.   Gait: Shuffling gait. Uses walker.        Labs:  CBC:   Lab Results   Component Value Date    WBC 5.7 09/22/2023    HGB 13.4 09/22/2023    HCT 42.7 09/22/2023     09/22/2023     BMP:   Lab Results   Component Value Date     09/22/2023    K 3.9 09/22/2023     09/22/2023    CO2 28 09/22/2023    BUN 27 (H) 09/22/2023    CREATININE 1.41 (H) 09/22/2023    CALCIUM 10.0 09/22/2023     LFT:   Lab Results   Component Value Date    ALKPHOS 116 09/22/2023    BILITOT 0.7 09/22/2023    PROT 7.2 09/22/2023    ALBUMIN 4.5 09/22/2023    ALT 4 (L) 09/22/2023    AST 12 09/22/2023       Assessment/Plan   Problem List Items Addressed This Visit             ICD-10-CM    Parkinson's disease - Primary G20.A1    This is a chronic neurologic condition that requires ongoing care and monitoring. This is a complex, serious condition that needs long term care going forward. Between myself and the patient we will be changing direction of care depending on responses to treatment.   Today we discussed medication options, non medication options for management and various other symptoms that are in relation to this disease.  I will continue to be involved in the care of this patient.    There is Noncomplicance.     Hallucinations/ dyskinesias - due to levodopa/ amantadine may make hallucinations worse.   Reduce Amantadine 100mg daily ( see if hallucinations get better     In 2 weeks if still having hallucinations, then STOP amantadine.  After 2-3 weeks: if dyskinesia is worse.   Sinemet 2 tabs  three times daily ( 7am/ 12pm/ 5pm)     Another option - We may lower ropinirole 6mg ER if hallucinations still bothersome.

## 2024-02-01 NOTE — PATIENT INSTRUCTIONS
"It was a pleasure seeing you today.     Week 1: Reduce Amantadine 100mg daily ( see if hallucinations get better)     In 2-3 weeks if still having hallucinations, then STOP amantadine.    After 2-3 weeks: if dyskinesia is worse.   Sinemet 2 tabs three times daily ( 7am/ 12pm/ 5pm)     Please make a follow up appointment phone call visit with me in 1 month, then regular follow up in 4 months.      For any urgent issues or needing to speak to a medical assistant please call [phone], option 6 during our office hours Monday-Friday 8am-4pm, and leave a voicemail with your concern.  My office will try to reach back you as soon as possible within 24 (business) hours.  If you have an emergency please call 911 or visit a local urgent care or nearest emergency room.      Please understand that [employer] is a useful communication tool for simple \"normal\" results or a refill request but I would not recommend using this tool for emergent or urgent issues or for conversations with me.  I am happy to ask my staff to rearrange a follow up visit or a virtual visit sooner than requested if appropriate for your care.     "

## 2024-02-10 DIAGNOSIS — G20.B1 PARKINSON'S DISEASE WITH DYSKINESIA WITHOUT FLUCTUATING MANIFESTATIONS (MULTI): Primary | ICD-10-CM

## 2024-02-12 RX ORDER — ROPINIROLE 8 MG/1
8 TABLET, EXTENDED RELEASE ORAL DAILY
Qty: 90 TABLET | Refills: 3 | Status: SHIPPED | OUTPATIENT
Start: 2024-02-12 | End: 2024-05-14 | Stop reason: SDUPTHER

## 2024-02-12 RX ORDER — AMANTADINE HYDROCHLORIDE 100 MG/1
100 CAPSULE, GELATIN COATED ORAL DAILY
Qty: 90 CAPSULE | Refills: 3 | Status: SHIPPED | OUTPATIENT
Start: 2024-02-12 | End: 2024-03-22 | Stop reason: SINTOL

## 2024-03-01 ENCOUNTER — TELEMEDICINE (OUTPATIENT)
Dept: NEUROLOGY | Facility: CLINIC | Age: 74
End: 2024-03-01
Payer: MEDICARE

## 2024-03-01 DIAGNOSIS — G20.B1 PARKINSON'S DISEASE WITH DYSKINESIA WITHOUT FLUCTUATING MANIFESTATIONS (MULTI): Primary | ICD-10-CM

## 2024-03-01 PROCEDURE — 1159F MED LIST DOCD IN RCRD: CPT | Performed by: PSYCHIATRY & NEUROLOGY

## 2024-03-01 PROCEDURE — G2211 COMPLEX E/M VISIT ADD ON: HCPCS | Performed by: PSYCHIATRY & NEUROLOGY

## 2024-03-01 PROCEDURE — 1160F RVW MEDS BY RX/DR IN RCRD: CPT | Performed by: PSYCHIATRY & NEUROLOGY

## 2024-03-01 PROCEDURE — 99213 OFFICE O/P EST LOW 20 MIN: CPT | Performed by: PSYCHIATRY & NEUROLOGY

## 2024-03-01 PROCEDURE — 1126F AMNT PAIN NOTED NONE PRSNT: CPT | Performed by: PSYCHIATRY & NEUROLOGY

## 2024-03-01 PROCEDURE — 1036F TOBACCO NON-USER: CPT | Performed by: PSYCHIATRY & NEUROLOGY

## 2024-03-01 NOTE — PATIENT INSTRUCTIONS
"It was a pleasure seeing you today.     Try Sinemet 1.5 tabs three times daily x 1 week, if more stiff/tremor/ slowness 1.5 tabs four time daily.  Another option is doing Gocovri.     Please make a follow up appointment as a phone visit in 3-4 weeks and keep your follow up visit.     For any urgent issues or needing to speak to a medical assistant please call [phone], option 6 during our office hours Monday-Friday 8am-4pm, and leave a voicemail with your concern.  My office will try to reach back you as soon as possible within 24 (business) hours.  If you have an emergency please call 911 or visit a local urgent care or nearest emergency room.      Please understand that [employer] is a useful communication tool for simple \"normal\" results or a refill request but I would not recommend using this tool for emergent or urgent issues or for conversations with me.  I am happy to ask my staff to rearrange a follow up visit or a virtual visit sooner than requested if appropriate for your care.    "

## 2024-03-01 NOTE — PROGRESS NOTES
Subjective     Nikki Person is a 73 y.o. year old female here for Parkinson's disease follow-up- virtual visit.    Parkinson's Disease  Amantadine 100mg daily and reduced Sinemet 25-100mg take 2 tabs from QID to TID- no hallucinations. 7am/12pm/5pm.  Last visit was 2/1 and the past few days she notices improved dyskinesia and hallucinations are better.     No dizziness.   Has trouble sleeping, wake up to urinate 6 times at night.   At that time amantadine was reduced to see if hallucinations improve.  She has fallen.   Her symptoms began in chest on 5 with slow movements, bradykinesia, balance issues and small handwriting.  She was seen by several neurologists in the past.  She saw Dr. Devries, Dr. Person, Dr. Kaye Amaral.    Patient currently is taking Sinemet 25/100 mg 2 tabs 4 times daily, ropinirole ER 8 mg daily for restless leg and amantadine 100 mg daily ( she did not remember what I told her last time to change things).    In the past she did try to change her Sinemet to 1.5 tabs 4 times a day but did not feel that it helped as much.  When being put on amantadine twice daily she had no dyskinesia anymore.  Review of Systems    Patient Active Problem List   Diagnosis    Age-related osteoporosis without current pathological fracture    Chronic cough    Dysarthria on examination    Dyskinesia    Eczema    Elevated serum creatinine    Malaise and fatigue    Nausea in adult    Non-intractable vomiting with nausea    Obstructive sleep apnea    Parkinson's disease    Restless legs syndrome    TMJ syndrome    Unspecified voice and resonance disorder    Medicare annual wellness visit, subsequent    Asymptomatic menopausal state    Screening for colorectal cancer    Routine general medical examination at health care facility    Acute renal failure superimposed on stage 3b chronic kidney disease (CMS/HCC)    Dry heaves    Sleep-related movement disorder    Impaired functional mobility, balance, gait, and endurance     Falls frequently     Past Medical History:   Diagnosis Date    Other nondisplaced fracture of upper end of left humerus, sequela 2020    Other closed nondisplaced fracture of proximal end of left humerus, sequela    Personal history of other infectious and parasitic diseases 2019    History of scabies    Somnolence 2020    Daytime somnolence     Past Surgical History:   Procedure Laterality Date    OTHER SURGICAL HISTORY  2019    Cholecystectomy    OTHER SURGICAL HISTORY  2019    Hysterectomy     Social History     Tobacco Use    Smoking status: Former     Types: Cigarettes     Quit date:      Years since quittin.1    Smokeless tobacco: Never   Substance Use Topics    Alcohol use: Never     family history includes Lymphoma in her father; malignant melanoma of skin in her sister; renal cell carcinoma in her mother.    Current Outpatient Medications:     amantadine (Symmetrel) 100 mg capsule, Take 1 capsule (100 mg) by mouth once daily., Disp: 90 capsule, Rfl: 3    calcium carb/magnesium ox,carb (DORIS-MAG ORAL), Take by mouth., Disp: , Rfl:     carbidopa-levodopa (Sinemet)  mg tablet, Take 2 tabs QID (Patient taking differently: 2 tablets 3 times a day.), Disp: 720 tablet, Rfl: 3    cholecalciferol (Vitamin D-3) 10 mcg/mL (400 unit/mL) drops, Take by mouth., Disp: , Rfl:     enzymes,digestive (DIGESTIVE ENZYMES ORAL), Take as directed, Disp: , Rfl:     lipase-protease-amylase 9,000-112,500- 112,500 unit capsule, Take by mouth., Disp: , Rfl:     magnesium oxide (Mag-Ox) 400 mg tablet, Take by mouth., Disp: , Rfl:     naproxen (Naprosyn) 500 mg tablet, Take 1 tablet (500 mg) by mouth 2 times a day as needed for mild pain (1 - 3) (pain)., Disp: 60 tablet, Rfl: 5    ondansetron (Zofran) 4 mg tablet, Take 1 tablet (4 mg) by mouth every 12 hours if needed for nausea., Disp: 180 tablet, Rfl: 3    rOPINIRole XL (Requip XL) 8 mg 24 hr tablet, TAKE 1 TABLET BY MOUTH DAILY, Disp: 90  tablet, Rfl: 3    TURMERIC ORAL, Take by mouth., Disp: , Rfl:     cyclobenzaprine (Flexeril) 10 mg tablet, Take 1 tablet (10 mg) by mouth 3 times a day as needed for muscle spasms. (Patient not taking: Reported on 3/1/2024), Disp: 90 tablet, Rfl: 1  Allergies   Allergen Reactions    Acetaminophen Unknown and Hives    Sulfamethoxazole-Trimethoprim Unknown and Hives     There were no vitals taken for this visit.  Neurological Exam/Physical Exam:  alert, oriented. face symmetric. speech is soft, hypophonic.  appropriate, conversational.   moves all extremities above gravity.   Neck dyskinesia ( she does not notice)       Labs:  CBC:   Lab Results   Component Value Date    WBC 5.7 09/22/2023    HGB 13.4 09/22/2023    HCT 42.7 09/22/2023     09/22/2023     BMP:   Lab Results   Component Value Date     09/22/2023    K 3.9 09/22/2023     09/22/2023    CO2 28 09/22/2023    BUN 27 (H) 09/22/2023    CREATININE 1.41 (H) 09/22/2023    CALCIUM 10.0 09/22/2023     LFT:   Lab Results   Component Value Date    ALKPHOS 116 09/22/2023    BILITOT 0.7 09/22/2023    PROT 7.2 09/22/2023    ALBUMIN 4.5 09/22/2023    ALT 4 (L) 09/22/2023    AST 12 09/22/2023       Assessment/Plan   Problem List Items Addressed This Visit    None   This is a chronic neurologic condition that requires ongoing care and monitoring. This is a complex, serious condition that needs long term care going forward. Between myself and the patient we will be changing direction of care depending on responses to treatment.   Today we discussed medication options, non medication options for management and various other symptoms that are in relation to this disease.  I will continue to be involved in the care of this patient.     Switching amantadine to long acting for dyskinesia and see if better tolerated than Amantadine- could try Gocovri. She wants to wait.  Try Sinemet 1.5 tabs three times daily x 1 week, if more stiff/tremor/ slowness 1.5 tabs four  times daily.  Another option is doing Gocovri.     Time spent 22 min

## 2024-03-06 ENCOUNTER — APPOINTMENT (OUTPATIENT)
Dept: PRIMARY CARE | Facility: CLINIC | Age: 74
End: 2024-03-06
Payer: MEDICARE

## 2024-03-22 ENCOUNTER — TELEMEDICINE (OUTPATIENT)
Dept: NEUROLOGY | Facility: CLINIC | Age: 74
End: 2024-03-22
Payer: MEDICARE

## 2024-03-22 DIAGNOSIS — G20.B1 PARKINSON'S DISEASE WITH DYSKINESIA WITHOUT FLUCTUATING MANIFESTATIONS (MULTI): Primary | ICD-10-CM

## 2024-03-22 PROCEDURE — 99442 PR PHYS/QHP TELEPHONE EVALUATION 11-20 MIN: CPT | Performed by: PSYCHIATRY & NEUROLOGY

## 2024-03-22 PROCEDURE — 1160F RVW MEDS BY RX/DR IN RCRD: CPT | Performed by: PSYCHIATRY & NEUROLOGY

## 2024-03-22 PROCEDURE — 1159F MED LIST DOCD IN RCRD: CPT | Performed by: PSYCHIATRY & NEUROLOGY

## 2024-03-22 PROCEDURE — 1036F TOBACCO NON-USER: CPT | Performed by: PSYCHIATRY & NEUROLOGY

## 2024-03-22 RX ORDER — AMANTADINE 137 MG/1
CAPSULE, COATED PELLETS ORAL
Qty: 180 CAPSULE | Refills: 3 | Status: SHIPPED | OUTPATIENT
Start: 2024-03-22

## 2024-03-22 RX ORDER — AMANTADINE 137 MG/1
CAPSULE, COATED PELLETS ORAL
Qty: 180 CAPSULE | Refills: 3 | Status: SHIPPED | OUTPATIENT
Start: 2024-03-22 | End: 2024-03-22 | Stop reason: SDUPTHER

## 2024-03-22 NOTE — PROGRESS NOTES
Subjective     Nikki Person is a 73 y.o. year old female here for Parkinson's disease follow-up virtual visit.    Parkinson's Disease  Amantadine 100mg 1x daily and reduced Sinemet 25-100mg take 2 tabs TID. Dyskinesia is bothersome, worse in legs.    No recent hallucinations.     Amantadine BID or TID did cause worsening hallucinations.     She was seen by several neurologists in the past.  She saw Dr. Devries, Dr. Person, Dr. Kaye Amaral.    Patient currently is taking Sinemet 25/100 mg 2 tabs 3 times daily, ropinirole ER 8 mg daily for restless leg and amantadine 100 mg daily ( she did not remember what I told her last time to change things).    In the past she did try to change her Sinemet to 1.5 tabs 4 times a day but did not feel that it helped as much.  When being put on amantadine twice daily she had no dyskinesia anymore ( but had hallucinations)  Review of Systems    Patient Active Problem List   Diagnosis    Age-related osteoporosis without current pathological fracture    Chronic cough    Dysarthria on examination    Dyskinesia    Eczema    Elevated serum creatinine    Malaise and fatigue    Nausea in adult    Non-intractable vomiting with nausea    Obstructive sleep apnea    Parkinson's disease    Restless legs syndrome    TMJ syndrome    Unspecified voice and resonance disorder    Medicare annual wellness visit, subsequent    Asymptomatic menopausal state    Screening for colorectal cancer    Routine general medical examination at health care facility    Acute renal failure superimposed on stage 3b chronic kidney disease (CMS/HCC)    Dry heaves    Sleep-related movement disorder    Impaired functional mobility, balance, gait, and endurance    Falls frequently     Past Medical History:   Diagnosis Date    Other nondisplaced fracture of upper end of left humerus, sequela 01/14/2020    Other closed nondisplaced fracture of proximal end of left humerus, sequela    Personal history of other infectious and  parasitic diseases 2019    History of scabies    Somnolence 2020    Daytime somnolence     Past Surgical History:   Procedure Laterality Date    OTHER SURGICAL HISTORY  2019    Cholecystectomy    OTHER SURGICAL HISTORY  2019    Hysterectomy     Social History     Tobacco Use    Smoking status: Former     Types: Cigarettes     Quit date:      Years since quittin.2    Smokeless tobacco: Never   Substance Use Topics    Alcohol use: Never     family history includes Lymphoma in her father; malignant melanoma of skin in her sister; renal cell carcinoma in her mother.    Current Outpatient Medications:     amantadine (Symmetrel) 100 mg capsule, Take 1 capsule (100 mg) by mouth once daily., Disp: 90 capsule, Rfl: 3    calcium carb/magnesium ox,carb (DORIS-MAG ORAL), Take by mouth., Disp: , Rfl:     carbidopa-levodopa (Sinemet)  mg tablet, Take 2 tabs QID (Patient taking differently: 2 tablets 3 times a day.), Disp: 720 tablet, Rfl: 3    cholecalciferol (Vitamin D-3) 10 mcg/mL (400 unit/mL) drops, Take by mouth., Disp: , Rfl:     cyclobenzaprine (Flexeril) 10 mg tablet, Take 1 tablet (10 mg) by mouth 3 times a day as needed for muscle spasms. (Patient not taking: Reported on 3/1/2024), Disp: 90 tablet, Rfl: 1    enzymes,digestive (DIGESTIVE ENZYMES ORAL), Take as directed, Disp: , Rfl:     lipase-protease-amylase 9,000-112,500- 112,500 unit capsule, Take by mouth., Disp: , Rfl:     magnesium oxide (Mag-Ox) 400 mg tablet, Take by mouth., Disp: , Rfl:     naproxen (Naprosyn) 500 mg tablet, Take 1 tablet (500 mg) by mouth 2 times a day as needed for mild pain (1 - 3) (pain)., Disp: 60 tablet, Rfl: 5    ondansetron (Zofran) 4 mg tablet, Take 1 tablet (4 mg) by mouth every 12 hours if needed for nausea., Disp: 180 tablet, Rfl: 3    rOPINIRole XL (Requip XL) 8 mg 24 hr tablet, TAKE 1 TABLET BY MOUTH DAILY, Disp: 90 tablet, Rfl: 3    TURMERIC ORAL, Take by mouth., Disp: , Rfl:   Allergies    Allergen Reactions    Acetaminophen Unknown and Hives    Sulfamethoxazole-Trimethoprim Unknown and Hives     There were no vitals taken for this visit.  Neurological Exam/Physical Exam:  alert, oriented. face symmetric. speech is soft, hypophonic.  appropriate, conversational.       Labs:  CBC:   Lab Results   Component Value Date    WBC 5.7 09/22/2023    HGB 13.4 09/22/2023    HCT 42.7 09/22/2023     09/22/2023     BMP:   Lab Results   Component Value Date     09/22/2023    K 3.9 09/22/2023     09/22/2023    CO2 28 09/22/2023    BUN 27 (H) 09/22/2023    CREATININE 1.41 (H) 09/22/2023    CALCIUM 10.0 09/22/2023     LFT:   Lab Results   Component Value Date    ALKPHOS 116 09/22/2023    BILITOT 0.7 09/22/2023    PROT 7.2 09/22/2023    ALBUMIN 4.5 09/22/2023    ALT 4 (L) 09/22/2023    AST 12 09/22/2023       Assessment/Plan   Problem List Items Addressed This Visit    None   This is a chronic neurologic condition that requires ongoing care and monitoring. This is a complex, serious condition that needs long term care going forward. Between myself and the patient we will be changing direction of care depending on responses to treatment.   Today we discussed medication options, non medication options for management and various other symptoms that are in relation to this disease.  I will continue to be involved in the care of this patient.    Reducing amantadine helped the hallucinations, dyskinesia is still bothersome.   I would like to try to switch amantadine to Gocovri. (May be more tolerable for helping dyskinesia and not causing hallucinations)  Continue  Sinemet 1.5 tabs TID    Time spent 20 min

## 2024-03-22 NOTE — PATIENT INSTRUCTIONS
"It was a pleasure seeing you today.     STOP Amantadine and start gocovri.     Please keep your June follow up appointment.     For any urgent issues or needing to speak to a medical assistant please call [phone], option 6 during our office hours Monday-Friday 8am-4pm, and leave a voicemail with your concern.  My office will try to reach back you as soon as possible within 24 (business) hours.  If you have an emergency please call 911 or visit a local urgent care or nearest emergency room.      Please understand that [employer] is a useful communication tool for simple \"normal\" results or a refill request but I would not recommend using this tool for emergent or urgent issues or for conversations with me.  I am happy to ask my staff to rearrange a follow up visit or a virtual visit sooner than requested if appropriate for your care.    "

## 2024-04-09 ENCOUNTER — OFFICE VISIT (OUTPATIENT)
Dept: PRIMARY CARE | Facility: CLINIC | Age: 74
End: 2024-04-09
Payer: MEDICARE

## 2024-04-09 VITALS
HEIGHT: 62 IN | WEIGHT: 162 LBS | BODY MASS INDEX: 29.81 KG/M2 | OXYGEN SATURATION: 96 % | SYSTOLIC BLOOD PRESSURE: 108 MMHG | RESPIRATION RATE: 16 BRPM | HEART RATE: 79 BPM | DIASTOLIC BLOOD PRESSURE: 68 MMHG

## 2024-04-09 DIAGNOSIS — Z12.31 BREAST CANCER SCREENING BY MAMMOGRAM: ICD-10-CM

## 2024-04-09 DIAGNOSIS — N18.32 STAGE 3B CHRONIC KIDNEY DISEASE (MULTI): ICD-10-CM

## 2024-04-09 DIAGNOSIS — G47.33 OBSTRUCTIVE SLEEP APNEA: ICD-10-CM

## 2024-04-09 DIAGNOSIS — M81.0 AGE-RELATED OSTEOPOROSIS WITHOUT CURRENT PATHOLOGICAL FRACTURE: ICD-10-CM

## 2024-04-09 DIAGNOSIS — Z00.00 MEDICARE ANNUAL WELLNESS VISIT, SUBSEQUENT: Primary | ICD-10-CM

## 2024-04-09 DIAGNOSIS — R47.1 DYSARTHRIA ON EXAMINATION: ICD-10-CM

## 2024-04-09 DIAGNOSIS — G25.81 RESTLESS LEGS SYNDROME: ICD-10-CM

## 2024-04-09 DIAGNOSIS — G20.B1 PARKINSON'S DISEASE WITH DYSKINESIA, UNSPECIFIED WHETHER MANIFESTATIONS FLUCTUATE (MULTI): ICD-10-CM

## 2024-04-09 PROBLEM — R07.81 RIB PAIN: Status: ACTIVE | Noted: 2024-04-09

## 2024-04-09 PROBLEM — R07.9 CHEST PAIN: Status: ACTIVE | Noted: 2024-04-09

## 2024-04-09 PROBLEM — B86 INFESTATION BY SARCOPTES SCABIEI: Status: ACTIVE | Noted: 2024-04-09

## 2024-04-09 PROBLEM — R35.0 INCREASED FREQUENCY OF URINATION: Status: ACTIVE | Noted: 2024-04-09

## 2024-04-09 PROBLEM — R09.82 POSTNASAL DRIP: Status: ACTIVE | Noted: 2024-04-09

## 2024-04-09 PROBLEM — G89.29 CHRONIC PAIN OF RIGHT UPPER EXTREMITY: Status: ACTIVE | Noted: 2024-04-09
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
## 2024-04-09 PROBLEM — M79.601 CHRONIC PAIN OF RIGHT UPPER EXTREMITY: Status: ACTIVE | Noted: 2024-04-09

## 2024-04-09 PROCEDURE — G0439 PPPS, SUBSEQ VISIT: HCPCS | Performed by: FAMILY MEDICINE

## 2024-04-09 PROCEDURE — 99214 OFFICE O/P EST MOD 30 MIN: CPT | Performed by: FAMILY MEDICINE

## 2024-04-09 PROCEDURE — 1036F TOBACCO NON-USER: CPT | Performed by: FAMILY MEDICINE

## 2024-04-09 PROCEDURE — 1160F RVW MEDS BY RX/DR IN RCRD: CPT | Performed by: FAMILY MEDICINE

## 2024-04-09 PROCEDURE — 1170F FXNL STATUS ASSESSED: CPT | Performed by: FAMILY MEDICINE

## 2024-04-09 PROCEDURE — 1158F ADVNC CARE PLAN TLK DOCD: CPT | Performed by: FAMILY MEDICINE

## 2024-04-09 PROCEDURE — 1159F MED LIST DOCD IN RCRD: CPT | Performed by: FAMILY MEDICINE

## 2024-04-09 PROCEDURE — 1123F ACP DISCUSS/DSCN MKR DOCD: CPT | Performed by: FAMILY MEDICINE

## 2024-04-09 RX ORDER — CHOLECALCIFEROL (VITAMIN D3) 10(400)/ML
DROPS ORAL
COMMUNITY
Start: 2018-04-10

## 2024-04-09 RX ORDER — PERMETHRIN 50 MG/G
CREAM TOPICAL
COMMUNITY
Start: 2019-12-17

## 2024-04-09 ASSESSMENT — PATIENT HEALTH QUESTIONNAIRE - PHQ9
SUM OF ALL RESPONSES TO PHQ9 QUESTIONS 1 AND 2: 0
2. FEELING DOWN, DEPRESSED OR HOPELESS: NOT AT ALL
2. FEELING DOWN, DEPRESSED OR HOPELESS: NOT AT ALL
SUM OF ALL RESPONSES TO PHQ9 QUESTIONS 1 AND 2: 0
1. LITTLE INTEREST OR PLEASURE IN DOING THINGS: NOT AT ALL
1. LITTLE INTEREST OR PLEASURE IN DOING THINGS: NOT AT ALL

## 2024-04-09 ASSESSMENT — ACTIVITIES OF DAILY LIVING (ADL)
GROCERY_SHOPPING: INDEPENDENT
MANAGING_FINANCES: INDEPENDENT
BATHING: INDEPENDENT
TAKING_MEDICATION: INDEPENDENT
DOING_HOUSEWORK: INDEPENDENT
DRESSING: INDEPENDENT

## 2024-04-09 NOTE — PROGRESS NOTES
"Subjective   Reason for Visit: Nikki Person is an 73 y.o. female here for a Medicare Wellness visit.     HPI  Concern's:     Right shoulder at the top.  Patient was wearing a heating pad with relief.  Pain worse with reaching to the opposite shoulder.  5/10 pain.  Nothing for pain.      Having issues with RSL in the night time.  She is was using tonic water with Quinine.  She drinks about 6 ounces with dinner.  Massages and stretches.       The patient's health since the last visit is described as good. There are no interval changes in the patient's PMH, PSH, and current medications. She has regular dental visits. She complains of vision problems. Vision care includes wearing glasses.  She denies hearing loss. Immunizations status: not up to date and refuses vaccinations.   Lifestyle: She consumes a diverse and healthy diet. She has weight concerns. And is losing.  Jhas decreased portion sizes.  She does not exercise regularly. She does not use tobacco. She denies alcohol use.   Reproductive health: the patient is postmenopausal.   Cervical cancer screening:. patient has no history of an abnormal pap smear.   Breast cancer screenin2022 normal. Screening interval recommendation: 2 years.   Colorectal Cancer Screening: Cologuard 2023 neg  Metabolic screening: lipid profile performed within the past five years.     Patient Care Team:  Dung Pineda DO as PCP - General  Dung Pineda DO as PCP - [employer] PCP     Review of Systems    Objective   Vitals:  /68 (BP Location: Right arm, Patient Position: Sitting, BP Cuff Size: Adult)   Pulse 79   Resp 16   Ht 1.575 m (5' 2\")   Wt 73.5 kg (162 lb)   SpO2 96%   BMI 29.63 kg/m²       Physical Exam  General: Patient is alert and oriented Ã--3 and appears in no acute distress. No respiratory distress.    Mouth: Normal mucosa. Moist. No erythema, exudates, tonsillar enlargement.    Neck: Normal range of motion, no masses. Thyroid is " palpable and normal in size without any nodules. No anterior cervical or posterior cervical adenopathy.    Heart: Regular rate and rhythm, no murmurs clicks or gallops    Lungs: Clear to auscultation bilaterally without any rhonchi rales or wheezing, lung sounds heard throughout all lung fields    Musculoskeletal: Normal range of motion, strength is grossly intact in the proximal distal muscles of the upper and lower extremities bilaterally, deep tendon reflexes +2 out of 4 and symmetric bilaterally at the patella, Achilles, biceps, triceps, sensation intact.    Nerves: Cranial nerves II through XII appear grossly intact and without deficit    Skin: Multiple excoriation marks occurring only on the lower extremities.    Psych: Normal affect. Denies suicidal or homicidal ideations.   Assessment/Plan   Problem List Items Addressed This Visit       Age-related osteoporosis without current pathological fracture    Dysarthria on examination    Obstructive sleep apnea    Parkinson's disease (CMS/HCC)    Restless legs syndrome    Medicare annual wellness visit, subsequent - Primary    Stage 3b chronic kidney disease (CMS/[facility])   Reviewed in for the patient's past medical history, surgical history, family history, social history  We reviewed the patient's activities of daily living and possible risk for falling. Patient is stable.  Discussed preventative screenings  Vaccinations were discussed in the office. We did review the patient's status on influenza vaccination, pneumonia vaccination, tetanus vaccination, and goes vaccination  Patient was instructed to follow-up with dentist regularly and also with eye doctor regularly  We discussed advanced directives including power of , living well and DO NOT RESUSCITATE orders    Parkinson's with Dyskinesia  - Stable and following with Dr Page   - She had reduction in medications and had improvement in hallucinations.  - PT again    IAIN  - No CPAP due to dry eyes  -  Better sleeping on side  - Trying to loose weight    RLS- controlled  - Ropirinole ER 8 mg.  Discussed decreasing the medication.  - Start Iron due to lower ferritin  - Increase activity  - Apply arnica topically    Osteopenia  - Have not had any fractures. Lowest T score of -1.4 in the Left Femur  - Suggested Matt Chi for weight bearing exercise   - Repeat Bone density in 2025  - Taking Calcium/Vit D K 2    CKD stage 3B  - GFR 47  - Urine microalbumin done in the office and was abnormal with small amount of albumin noted  - US kidneys ordered  -Discussed possible medication to help out with the kidneys         Biceps tendinitis  - Exercises were printed  - Instructed use Arnica topically

## 2024-04-15 ENCOUNTER — HOSPITAL ENCOUNTER (OUTPATIENT)
Dept: RADIOLOGY | Facility: HOSPITAL | Age: 74
Discharge: HOME | End: 2024-04-15
Payer: MEDICARE

## 2024-04-15 DIAGNOSIS — M81.0 AGE-RELATED OSTEOPOROSIS WITHOUT CURRENT PATHOLOGICAL FRACTURE: ICD-10-CM

## 2024-04-15 DIAGNOSIS — G20.B1 PARKINSON'S DISEASE WITH DYSKINESIA, UNSPECIFIED WHETHER MANIFESTATIONS FLUCTUATE (MULTI): ICD-10-CM

## 2024-04-15 DIAGNOSIS — N18.32 STAGE 3B CHRONIC KIDNEY DISEASE (MULTI): ICD-10-CM

## 2024-04-15 PROCEDURE — 76770 US EXAM ABDO BACK WALL COMP: CPT | Performed by: RADIOLOGY

## 2024-04-15 PROCEDURE — 76770 US EXAM ABDO BACK WALL COMP: CPT

## 2024-04-16 ENCOUNTER — TELEPHONE (OUTPATIENT)
Dept: PRIMARY CARE | Facility: CLINIC | Age: 74
End: 2024-04-16
Payer: MEDICARE

## 2024-04-16 NOTE — TELEPHONE ENCOUNTER
----- Message from Dung Pineda DO sent at 4/16/2024  3:51 PM EDT -----  Please let the patient know that the ultrasound of the kidneys was normal

## 2024-04-22 ENCOUNTER — TELEPHONE (OUTPATIENT)
Dept: PRIMARY CARE | Facility: CLINIC | Age: 74
End: 2024-04-22
Payer: MEDICARE

## 2024-04-22 NOTE — TELEPHONE ENCOUNTER
Pt would like to know how long you want her to be on the iron supplements that she got from next door.    Called and left message on her voicemail that she should take iron supplements for 2 mos per Dr Pineda. Any questions she can call us back.

## 2024-05-01 ENCOUNTER — HOSPITAL ENCOUNTER (OUTPATIENT)
Dept: RADIOLOGY | Facility: HOSPITAL | Age: 74
Discharge: HOME | End: 2024-05-01
Payer: MEDICARE

## 2024-05-01 DIAGNOSIS — Z12.31 BREAST CANCER SCREENING BY MAMMOGRAM: ICD-10-CM

## 2024-05-01 DIAGNOSIS — M81.0 AGE-RELATED OSTEOPOROSIS WITHOUT CURRENT PATHOLOGICAL FRACTURE: ICD-10-CM

## 2024-05-01 DIAGNOSIS — G20.B1 PARKINSON'S DISEASE WITH DYSKINESIA, UNSPECIFIED WHETHER MANIFESTATIONS FLUCTUATE (MULTI): ICD-10-CM

## 2024-05-01 PROCEDURE — 77063 BREAST TOMOSYNTHESIS BI: CPT | Performed by: RADIOLOGY

## 2024-05-01 PROCEDURE — 77067 SCR MAMMO BI INCL CAD: CPT

## 2024-05-01 PROCEDURE — 77067 SCR MAMMO BI INCL CAD: CPT | Performed by: RADIOLOGY

## 2024-05-07 ENCOUNTER — TELEPHONE (OUTPATIENT)
Dept: PRIMARY CARE | Facility: CLINIC | Age: 74
End: 2024-05-07
Payer: MEDICARE

## 2024-05-07 DIAGNOSIS — G20.B1 PARKINSON'S DISEASE WITH DYSKINESIA WITHOUT FLUCTUATING MANIFESTATIONS (MULTI): ICD-10-CM

## 2024-05-07 NOTE — TELEPHONE ENCOUNTER
Pt called to make 6mo followup but would like to stop taking requip and wants to know if she has to be weaned off or can she just stop it. Told her someone would call her back

## 2024-05-14 RX ORDER — ROPINIROLE HYDROCHLORIDE 6 MG/1
6 TABLET, FILM COATED, EXTENDED RELEASE ORAL DAILY
Qty: 30 TABLET | Refills: 0 | Status: SHIPPED | OUTPATIENT
Start: 2024-05-14 | End: 2024-05-29 | Stop reason: SDUPTHER

## 2024-05-14 RX ORDER — ROPINIROLE 4 MG/1
4 TABLET, FILM COATED, EXTENDED RELEASE ORAL DAILY
Qty: 30 TABLET | Refills: 0 | Status: SHIPPED | OUTPATIENT
Start: 2024-05-14 | End: 2024-05-14 | Stop reason: SDUPTHER

## 2024-05-29 DIAGNOSIS — G20.B1 PARKINSON'S DISEASE WITH DYSKINESIA WITHOUT FLUCTUATING MANIFESTATIONS (MULTI): ICD-10-CM

## 2024-05-29 RX ORDER — ROPINIROLE HYDROCHLORIDE 6 MG/1
6 TABLET, FILM COATED, EXTENDED RELEASE ORAL DAILY
Qty: 30 TABLET | Refills: 0 | Status: SHIPPED | OUTPATIENT
Start: 2024-05-29 | End: 2024-06-28

## 2024-06-12 DIAGNOSIS — G20.B1 PARKINSON'S DISEASE WITH DYSKINESIA WITHOUT FLUCTUATING MANIFESTATIONS (MULTI): ICD-10-CM

## 2024-06-13 RX ORDER — ROPINIROLE HYDROCHLORIDE 6 MG/1
6 TABLET, FILM COATED, EXTENDED RELEASE ORAL DAILY
Qty: 30 TABLET | Refills: 11 | Status: SHIPPED | OUTPATIENT
Start: 2024-06-13

## 2024-06-17 ENCOUNTER — PATIENT MESSAGE (OUTPATIENT)
Dept: PRIMARY CARE | Facility: CLINIC | Age: 74
End: 2024-06-17

## 2024-06-17 DIAGNOSIS — G20.B1 PARKINSON'S DISEASE WITH DYSKINESIA WITHOUT FLUCTUATING MANIFESTATIONS (MULTI): Primary | ICD-10-CM

## 2024-06-20 DIAGNOSIS — G20.B1 PARKINSON'S DISEASE WITH DYSKINESIA WITHOUT FLUCTUATING MANIFESTATIONS (MULTI): ICD-10-CM

## 2024-06-20 RX ORDER — ROPINIROLE HYDROCHLORIDE 4 MG/1
4 TABLET, FILM COATED, EXTENDED RELEASE ORAL DAILY
Qty: 30 TABLET | Refills: 11 | Status: SHIPPED | OUTPATIENT
Start: 2024-06-20 | End: 2025-06-15

## 2024-06-20 NOTE — TELEPHONE ENCOUNTER
Patient wanted to confirm if she is supposed to be on 4mg or 6 mg of ropinerole. She thought she was supposed to go down to 4 mg

## 2024-06-24 ENCOUNTER — APPOINTMENT (OUTPATIENT)
Dept: NEUROLOGY | Facility: CLINIC | Age: 74
End: 2024-06-24
Payer: MEDICARE

## 2024-06-24 VITALS
HEIGHT: 62 IN | DIASTOLIC BLOOD PRESSURE: 72 MMHG | TEMPERATURE: 97.5 F | HEART RATE: 87 BPM | WEIGHT: 160 LBS | RESPIRATION RATE: 18 BRPM | SYSTOLIC BLOOD PRESSURE: 141 MMHG | BODY MASS INDEX: 29.44 KG/M2

## 2024-06-24 DIAGNOSIS — G20.B1 PARKINSON'S DISEASE WITH DYSKINESIA WITHOUT FLUCTUATING MANIFESTATIONS (MULTI): Primary | ICD-10-CM

## 2024-06-24 PROCEDURE — 1036F TOBACCO NON-USER: CPT | Performed by: PSYCHIATRY & NEUROLOGY

## 2024-06-24 PROCEDURE — 1160F RVW MEDS BY RX/DR IN RCRD: CPT | Performed by: PSYCHIATRY & NEUROLOGY

## 2024-06-24 PROCEDURE — G2211 COMPLEX E/M VISIT ADD ON: HCPCS | Performed by: PSYCHIATRY & NEUROLOGY

## 2024-06-24 PROCEDURE — 99214 OFFICE O/P EST MOD 30 MIN: CPT | Performed by: PSYCHIATRY & NEUROLOGY

## 2024-06-24 PROCEDURE — 1159F MED LIST DOCD IN RCRD: CPT | Performed by: PSYCHIATRY & NEUROLOGY

## 2024-06-24 PROCEDURE — 1123F ACP DISCUSS/DSCN MKR DOCD: CPT | Performed by: PSYCHIATRY & NEUROLOGY

## 2024-06-24 RX ORDER — CARBIDOPA AND LEVODOPA 25; 100 MG/1; MG/1
2 TABLET ORAL 3 TIMES DAILY
Start: 2024-06-24

## 2024-06-24 RX ORDER — AMANTADINE HYDROCHLORIDE 100 MG/1
100 CAPSULE, GELATIN COATED ORAL DAILY
COMMUNITY

## 2024-06-24 NOTE — PROGRESS NOTES
Subjective     Nikki Person is a 74 y.o. year old female here for Parkinson's disease follow-up.    HPI  Patient is here with  today who also provides history.  She had two virtual visits with me in March.  Amantadine was reduced to 100mg daily due to hallucinations.  I prescribed gocovri last time but too costly so was not tried.      Dyskinesias are mild.    No hallucinations.     Her symptoms began in 2008 with slow movements, bradykinesia, balance issues and small handwriting.    She was seen by several neurologists in the past including Dr. Devries, Dr. Person, Dr. Kaye Amaral.    Patient currently is taking Sinemet 25/100 mg 2 tabs 3 times daily, ropinirole ER 4 mg daily for restless leg and amantadine 100 mg at lunchtime.     In the past she did try to change her Sinemet to 1.5 tabs 4 times a day but did not feel that it helped as much.    When she was on amantadine twice daily she had no dyskinesia anymore.    Review of Systems    Patient Active Problem List   Diagnosis    Age-related osteoporosis without current pathological fracture    Chronic cough    Dysarthria on examination    Dyskinesia    Eczema    Elevated serum creatinine    Malaise and fatigue    Nausea in adult    Non-intractable vomiting with nausea    Obstructive sleep apnea    Parkinson's disease (Multi)    Restless legs syndrome    TMJ syndrome    Unspecified voice and resonance disorder    Medicare annual wellness visit, subsequent    Asymptomatic menopausal state    Screening for colorectal cancer    Routine general medical examination at health care facility    Acute renal failure superimposed on stage 3b chronic kidney disease (Multi)    Dry heaves    Sleep-related movement disorder    Impaired functional mobility, balance, gait, and endurance    Falls frequently    Chest pain    Chronic pain of right upper extremity    Fall    Increased frequency of urination    Infestation by Sarcoptes scabiei    Postnasal drip    Rib pain     Stage 3b chronic kidney disease (Multi)     Past Medical History:   Diagnosis Date    Other nondisplaced fracture of upper end of left humerus, sequela 2020    Other closed nondisplaced fracture of proximal end of left humerus, sequela    Personal history of other infectious and parasitic diseases 2019    History of scabies    Somnolence 2020    Daytime somnolence     Past Surgical History:   Procedure Laterality Date    OTHER SURGICAL HISTORY  2019    Cholecystectomy    OTHER SURGICAL HISTORY  2019    Hysterectomy     Social History     Tobacco Use    Smoking status: Former     Current packs/day: 0.00     Types: Cigarettes     Quit date:      Years since quittin.5    Smokeless tobacco: Never   Substance Use Topics    Alcohol use: Never     family history includes Lymphoma in her father; malignant melanoma of skin in her sister; renal cell carcinoma in her mother.    Current Outpatient Medications:     amantadine HCL (Gocovri) 137 mg capsule,extended release 24hr, Take 1 cap at bedtime x 7 days then 2 caps at bedtime and continue, Disp: 180 capsule, Rfl: 3    calcium carb/magnesium ox,carb (DORIS-MAG ORAL), Take by mouth., Disp: , Rfl:     carbidopa-levodopa (Sinemet)  mg tablet, Take 2 tabs QID (Patient taking differently: 2 tablets 3 times a day.), Disp: 720 tablet, Rfl: 3    cholecalciferol (Vitamin D-3) 10 mcg/mL (400 unit/mL) drops, Take by mouth., Disp: , Rfl:     cyclobenzaprine (Flexeril) 10 mg tablet, Take 1 tablet (10 mg) by mouth 3 times a day as needed for muscle spasms., Disp: 90 tablet, Rfl: 1    digestive enzymes combo no.7 (Superior Digestive Enzyme) capsule, Take by mouth., Disp: , Rfl:     enzymes,digestive (DIGESTIVE ENZYMES ORAL), Take as directed, Disp: , Rfl:     lipase-protease-amylase 9,000-112,500- 112,500 unit capsule, Take by mouth., Disp: , Rfl:     magnesium oxide (Mag-Ox) 400 mg tablet, Take by mouth., Disp: , Rfl:     permethrin (Elimite) 5 %  cream, once daily., Disp: , Rfl:     rOPINIRole XL (Requip XL) 4 mg 24 hr tablet, Take 1 tablet (4 mg) by mouth once daily., Disp: 30 tablet, Rfl: 11    TURMERIC ORAL, Take by mouth., Disp: , Rfl:   Allergies   Allergen Reactions    Acetaminophen Unknown and Hives    Sulfamethoxazole-Trimethoprim Unknown and Hives     There were no vitals taken for this visit.  Neurological Exam/Physical Exam:    Constitutional: General appearance: no acute distress. Pleasant.  Hypomimia, and dyskinesia of head   Auscultation of Heart: Regular rate and rhythm, no murmurs, normal S1 and S2.   Carotid Arteries: no bruits  Peripheral Vascular Exam: No swelling, edema or tenderness to palpation in extremities  Mental status: no distress, alert, interactive and cooperative. Affect is appropriate.   Orientation: oriented to person, oriented to place and oriented to time.   Memory: recent memory intact and remote memory intact.   Attention: normal attention /concentration.   Language: normal comprehension and naming.   Fund of knowledge: Patient displays adequate knowledge of current events.  Eyes: The ophthalmoscopic examination was normal.   Cranial nerve II: Visual fields full to confrontation.   Cranial nerves III, IV, and VI: Pupils round, equally reactive to light; EOMs intact. No nystagmus.   Cranial Nerve V: Facial sensation intact to LT bilaterally.   Cranial nerve VII: no facial droop  Cranial nerve VIII: Hearing is intact  Cranial nerves IX and X: Palate elevates symmetrically.   Cranial nerve XI: Shoulder shrug intact.   Cranial nerve XII: Tongue is midline.  Motor:  Muscle bulk was normal in both upper and lower extremities.    No atrophy.   Strength is normal.  Right hand tremor seen. Head dyskinesia mild, more so on the right side, mild bradykinesia.   Deep Tendon Reflexes: left biceps 2+ , right biceps 2+, left triceps 2+, right triceps 2+, left brachioradialis 2+, right brachioradialis 2+, left patella 2+, right patella  2+, left ankle jerk 2+, right ankle jerk 2+   Plantar Reflex: Toes downgoing to plantar stimulation on the left. Toes downgoing to plantar stimulation on the right.   Sensory Exam: Normal to vibratory sensation  Coordination:  no limb dystaxia and rapid alternating movements are intact.   Gait: Shuffling gait. Uses walker.        Labs:  CBC:   Lab Results   Component Value Date    WBC 5.7 09/22/2023    HGB 13.4 09/22/2023    HCT 42.7 09/22/2023     09/22/2023     BMP:   Lab Results   Component Value Date     09/22/2023    K 3.9 09/22/2023     09/22/2023    CO2 28 09/22/2023    BUN 27 (H) 09/22/2023    CREATININE 1.41 (H) 09/22/2023    CALCIUM 10.0 09/22/2023     LFT:   Lab Results   Component Value Date    ALKPHOS 116 09/22/2023    BILITOT 0.7 09/22/2023    PROT 7.2 09/22/2023    ALBUMIN 4.5 09/22/2023    ALT 4 (L) 09/22/2023    AST 12 09/22/2023       Assessment/Plan   Problem List Items Addressed This Visit    None     This is a chronic neurologic condition that requires ongoing care and monitoring. This is a complex, serious condition that needs long term care going forward. Between myself and the patient we will be changing direction of care depending on responses to treatment.   Today we discussed medication options, non medication options for management and various other symptoms that are in relation to this disease.  I will continue to be involved in the care of this patient.    There is Noncomplicance.     Hallucinations/ dyskinesias - due to levodopa/ amantadine may make hallucinations worse. Those seem better. Keep the regimen same.

## 2024-06-24 NOTE — PATIENT INSTRUCTIONS
"It was a pleasure seeing you today.     Please make a follow up appointment  in 4 months.     For any urgent issues or needing to speak to a medical assistant please call [phone], option 6 during our office hours Monday-Friday 8am-4pm, and leave a voicemail with your concern.  My office will try to reach back you as soon as possible within 24 (business) hours.  If you have an emergency please call 911 or visit a local urgent care or nearest emergency room.      Please understand that [employer] is a useful communication tool for simple \"normal\" results or a refill request but I would not recommend using this tool for emergent or urgent issues or for conversations with me.  I am happy to ask my staff to rearrange a follow up visit or a virtual visit sooner than requested if appropriate for your care.    "

## 2024-07-12 NOTE — PROGRESS NOTES
Subjective   Patient ID: Nikki Person is a 73 y.o. female who presents for cough.  HPI  Has had a cough that comes and goes.  It has worsened.  She cannot sleep at night. When she sits in the evening and lays down she coughs a lot.  Non productive.  Had a lot of phlegm In the throat.  She has nothing to spit up now.   She shotness of breath, fever, myalgias.    Review of Systems    Objective   Physical Exam    Assessment/Plan   Problem List Items Addressed This Visit    None  Visit Diagnoses       Postnasal drip    -  Primary    Acute cough            Salt water gargles 3 x day and  nasal rinses 3 x day   mg 2 x day  Bromelain 500 mg 2 x day nothing to eat 1 hour or after  Homeopathic Drosera 30C     Azithromycin and tessalon perels     No

## 2024-07-30 RX ORDER — ROPINIROLE HYDROCHLORIDE 2 MG/1
2 TABLET, FILM COATED, EXTENDED RELEASE ORAL NIGHTLY
Qty: 90 TABLET | Refills: 0 | Status: SHIPPED | OUTPATIENT
Start: 2024-07-30 | End: 2025-07-30

## 2024-08-14 ENCOUNTER — LAB (OUTPATIENT)
Dept: LAB | Facility: LAB | Age: 74
End: 2024-08-14
Payer: MEDICARE

## 2024-08-14 DIAGNOSIS — G20.B1 PARKINSON'S DISEASE WITH DYSKINESIA, UNSPECIFIED WHETHER MANIFESTATIONS FLUCTUATE (MULTI): ICD-10-CM

## 2024-08-14 DIAGNOSIS — M81.0 AGE-RELATED OSTEOPOROSIS WITHOUT CURRENT PATHOLOGICAL FRACTURE: ICD-10-CM

## 2024-08-14 DIAGNOSIS — N18.32 STAGE 3B CHRONIC KIDNEY DISEASE (MULTI): ICD-10-CM

## 2024-08-14 LAB
ALBUMIN SERPL BCP-MCNC: 4.3 G/DL (ref 3.4–5)
ALP SERPL-CCNC: 73 U/L (ref 33–136)
ALT SERPL W P-5'-P-CCNC: 5 U/L (ref 7–45)
ANION GAP SERPL CALC-SCNC: 13 MMOL/L (ref 10–20)
AST SERPL W P-5'-P-CCNC: 15 U/L (ref 9–39)
BASOPHILS # BLD AUTO: 0.07 X10*3/UL (ref 0–0.1)
BASOPHILS NFR BLD AUTO: 1.2 %
BILIRUB SERPL-MCNC: 0.5 MG/DL (ref 0–1.2)
BUN SERPL-MCNC: 34 MG/DL (ref 6–23)
CALCIUM SERPL-MCNC: 9.9 MG/DL (ref 8.6–10.3)
CHLORIDE SERPL-SCNC: 105 MMOL/L (ref 98–107)
CHOLEST SERPL-MCNC: 242 MG/DL (ref 0–199)
CHOLESTEROL/HDL RATIO: 3.9
CO2 SERPL-SCNC: 26 MMOL/L (ref 21–32)
CREAT SERPL-MCNC: 1.32 MG/DL (ref 0.5–1.05)
EGFRCR SERPLBLD CKD-EPI 2021: 42 ML/MIN/1.73M*2
EOSINOPHIL # BLD AUTO: 0.11 X10*3/UL (ref 0–0.4)
EOSINOPHIL NFR BLD AUTO: 1.9 %
ERYTHROCYTE [DISTWIDTH] IN BLOOD BY AUTOMATED COUNT: 13.7 % (ref 11.5–14.5)
GLUCOSE SERPL-MCNC: 91 MG/DL (ref 74–99)
HCT VFR BLD AUTO: 43.6 % (ref 36–46)
HDLC SERPL-MCNC: 62.8 MG/DL
HGB BLD-MCNC: 13.2 G/DL (ref 12–16)
IMM GRANULOCYTES # BLD AUTO: 0.01 X10*3/UL (ref 0–0.5)
IMM GRANULOCYTES NFR BLD AUTO: 0.2 % (ref 0–0.9)
LDLC SERPL CALC-MCNC: 163 MG/DL
LYMPHOCYTES # BLD AUTO: 1.36 X10*3/UL (ref 0.8–3)
LYMPHOCYTES NFR BLD AUTO: 23.7 %
MCH RBC QN AUTO: 28 PG (ref 26–34)
MCHC RBC AUTO-ENTMCNC: 30.3 G/DL (ref 32–36)
MCV RBC AUTO: 93 FL (ref 80–100)
MONOCYTES # BLD AUTO: 0.43 X10*3/UL (ref 0.05–0.8)
MONOCYTES NFR BLD AUTO: 7.5 %
NEUTROPHILS # BLD AUTO: 3.77 X10*3/UL (ref 1.6–5.5)
NEUTROPHILS NFR BLD AUTO: 65.5 %
NON HDL CHOLESTEROL: 179 MG/DL (ref 0–149)
NRBC BLD-RTO: 0 /100 WBCS (ref 0–0)
PLATELET # BLD AUTO: 300 X10*3/UL (ref 150–450)
POTASSIUM SERPL-SCNC: 4.3 MMOL/L (ref 3.5–5.3)
PROT SERPL-MCNC: 7.1 G/DL (ref 6.4–8.2)
RBC # BLD AUTO: 4.71 X10*6/UL (ref 4–5.2)
SODIUM SERPL-SCNC: 140 MMOL/L (ref 136–145)
TRIGL SERPL-MCNC: 79 MG/DL (ref 0–149)
VIT B12 SERPL-MCNC: 791 PG/ML (ref 211–911)
VLDL: 16 MG/DL (ref 0–40)
WBC # BLD AUTO: 5.8 X10*3/UL (ref 4.4–11.3)

## 2024-08-14 PROCEDURE — 82607 VITAMIN B-12: CPT

## 2024-08-14 PROCEDURE — 85025 COMPLETE CBC W/AUTO DIFF WBC: CPT

## 2024-08-14 PROCEDURE — 80061 LIPID PANEL: CPT

## 2024-08-14 PROCEDURE — 80053 COMPREHEN METABOLIC PANEL: CPT

## 2024-08-14 PROCEDURE — 36415 COLL VENOUS BLD VENIPUNCTURE: CPT

## 2024-08-28 ENCOUNTER — TELEPHONE (OUTPATIENT)
Dept: PRIMARY CARE | Facility: CLINIC | Age: 74
End: 2024-08-28
Payer: MEDICARE

## 2024-08-28 NOTE — TELEPHONE ENCOUNTER
Patient called wanted to know if she is ok to take collagen protein supplements. She broke her right arm a few days ago and saw orthopedics. No need for surgery at this time. Patient's daughter wanted her to ask about the collagen though to see if maybe that would help speed up healing

## 2024-09-24 DIAGNOSIS — G20.B1 PARKINSON'S DISEASE WITH DYSKINESIA WITHOUT FLUCTUATING MANIFESTATIONS: ICD-10-CM

## 2024-09-24 RX ORDER — ROPINIROLE HYDROCHLORIDE 2 MG/1
2 TABLET, FILM COATED, EXTENDED RELEASE ORAL NIGHTLY
Qty: 90 TABLET | Refills: 3 | Status: SHIPPED | OUTPATIENT
Start: 2024-09-24

## 2024-10-07 ENCOUNTER — APPOINTMENT (OUTPATIENT)
Dept: PRIMARY CARE | Facility: CLINIC | Age: 74
End: 2024-10-07
Payer: MEDICARE

## 2024-10-07 VITALS
OXYGEN SATURATION: 99 % | DIASTOLIC BLOOD PRESSURE: 68 MMHG | WEIGHT: 155 LBS | SYSTOLIC BLOOD PRESSURE: 130 MMHG | TEMPERATURE: 97.8 F | BODY MASS INDEX: 28.52 KG/M2 | HEIGHT: 62 IN | HEART RATE: 74 BPM

## 2024-10-07 DIAGNOSIS — G47.00 INSOMNIA, UNSPECIFIED TYPE: ICD-10-CM

## 2024-10-07 DIAGNOSIS — R35.0 URINARY FREQUENCY: ICD-10-CM

## 2024-10-07 DIAGNOSIS — M81.0 AGE-RELATED OSTEOPOROSIS WITHOUT CURRENT PATHOLOGICAL FRACTURE: ICD-10-CM

## 2024-10-07 DIAGNOSIS — Z00.00 ANNUAL PHYSICAL EXAM: Primary | ICD-10-CM

## 2024-10-07 DIAGNOSIS — G25.81 RESTLESS LEGS SYNDROME: ICD-10-CM

## 2024-10-07 DIAGNOSIS — N18.32 STAGE 3B CHRONIC KIDNEY DISEASE (MULTI): ICD-10-CM

## 2024-10-07 DIAGNOSIS — G20.B1 PARKINSON'S DISEASE WITH DYSKINESIA WITHOUT FLUCTUATING MANIFESTATIONS: ICD-10-CM

## 2024-10-07 DIAGNOSIS — G47.33 OBSTRUCTIVE SLEEP APNEA: ICD-10-CM

## 2024-10-07 LAB
POC ALBUMIN /CREATININE RATIO MANUALLY ENTERED: ABNORMAL UG/MG CREAT
POC APPEARANCE, URINE: CLEAR
POC BILIRUBIN, URINE: NEGATIVE
POC BLOOD, URINE: ABNORMAL
POC COLOR, URINE: YELLOW
POC GLUCOSE, URINE: NEGATIVE MG/DL
POC KETONES, URINE: NEGATIVE MG/DL
POC LEUKOCYTES, URINE: ABNORMAL
POC NITRITE,URINE: NEGATIVE
POC PH, URINE: 7 PH
POC PROTEIN, URINE: NEGATIVE MG/DL
POC SPECIFIC GRAVITY, URINE: 1.02
POC URINE ALBUMIN: 80 MG/L
POC URINE CREATININE: 100 MG/DL
POC UROBILINOGEN, URINE: 0.2 EU/DL

## 2024-10-07 PROCEDURE — 1123F ACP DISCUSS/DSCN MKR DOCD: CPT | Performed by: FAMILY MEDICINE

## 2024-10-07 PROCEDURE — 99397 PER PM REEVAL EST PAT 65+ YR: CPT | Performed by: FAMILY MEDICINE

## 2024-10-07 PROCEDURE — 87086 URINE CULTURE/COLONY COUNT: CPT

## 2024-10-07 PROCEDURE — 1159F MED LIST DOCD IN RCRD: CPT | Performed by: FAMILY MEDICINE

## 2024-10-07 PROCEDURE — 82044 UR ALBUMIN SEMIQUANTITATIVE: CPT | Performed by: FAMILY MEDICINE

## 2024-10-07 PROCEDURE — 81003 URINALYSIS AUTO W/O SCOPE: CPT | Performed by: FAMILY MEDICINE

## 2024-10-07 PROCEDURE — 99214 OFFICE O/P EST MOD 30 MIN: CPT | Performed by: FAMILY MEDICINE

## 2024-10-07 PROCEDURE — 3008F BODY MASS INDEX DOCD: CPT | Performed by: FAMILY MEDICINE

## 2024-10-07 PROCEDURE — 1160F RVW MEDS BY RX/DR IN RCRD: CPT | Performed by: FAMILY MEDICINE

## 2024-10-07 RX ORDER — CYCLOBENZAPRINE HCL 10 MG
TABLET ORAL
COMMUNITY
Start: 2023-10-30

## 2024-10-07 RX ORDER — PRAMIPEXOLE 1.5 MG/1
TABLET, EXTENDED RELEASE ORAL
COMMUNITY
Start: 2023-10-27

## 2024-10-07 RX ORDER — OXYCODONE HYDROCHLORIDE 5 MG/1
TABLET ORAL
COMMUNITY
Start: 2024-08-26

## 2024-10-07 RX ORDER — TRAZODONE HYDROCHLORIDE 50 MG/1
100 TABLET ORAL NIGHTLY
Qty: 180 TABLET | Refills: 3 | Status: SHIPPED | OUTPATIENT
Start: 2024-10-07 | End: 2025-10-07

## 2024-10-07 ASSESSMENT — PATIENT HEALTH QUESTIONNAIRE - PHQ9
1. LITTLE INTEREST OR PLEASURE IN DOING THINGS: MORE THAN HALF THE DAYS
10. IF YOU CHECKED OFF ANY PROBLEMS, HOW DIFFICULT HAVE THESE PROBLEMS MADE IT FOR YOU TO DO YOUR WORK, TAKE CARE OF THINGS AT HOME, OR GET ALONG WITH OTHER PEOPLE: SOMEWHAT DIFFICULT
2. FEELING DOWN, DEPRESSED OR HOPELESS: MORE THAN HALF THE DAYS
SUM OF ALL RESPONSES TO PHQ9 QUESTIONS 1 AND 2: 4

## 2024-10-07 NOTE — PROGRESS NOTES
Subjective   Patient ID: Nikki Person is a 74 y.o. female who presents for well exam (Fell , broke her shoulder right /Going to therapy for her shoulder/Ask about zeequil , doesn't sleep well at night, has tried banana and cumin. She has restless legs and put mag gel on them but she doesn't stay asleep all night).         Reviewed all medications by prescribing practitioner or clinical pharmacist (such as prescriptions, OTCs, herbal therapies and supplements) and documented in the medical record.    HPI  1.  Physical exam.  Pap: last done   Mammogram: last done 2024  Colonoscopy: last done   Immunizations:   Walking x4 per week     Fall   -turned around and fell, trying to catch herself on the counter  -Due for a follow up xray this week  -Currently doing PT   -Naproxen currently, mainly at night      Montez for sleep?    Urinary frequency  -worse at night  -getting up every 2 hours   -tries to restrict fluids before bed     Review of Systems  All pertinent positive symptoms are included in the history of present illness.    All other systems have been reviewed and are negative and noncontributory to this patient's current ailments.    Past Medical History:   Diagnosis Date    Other nondisplaced fracture of upper end of left humerus, sequela 2020    Other closed nondisplaced fracture of proximal end of left humerus, sequela    Personal history of other infectious and parasitic diseases 2019    History of scabies    Somnolence 2020    Daytime somnolence     Past Surgical History:   Procedure Laterality Date    OTHER SURGICAL HISTORY  2019    Cholecystectomy    OTHER SURGICAL HISTORY  2019    Hysterectomy     Social History     Tobacco Use    Smoking status: Former     Current packs/day: 0.00     Types: Cigarettes     Quit date:      Years since quittin.7    Smokeless tobacco: Never   Substance Use Topics    Alcohol use: Never    Drug use: Never     Family History  "  Problem Relation Name Age of Onset    Other (renal cell carcinoma) Mother      Lymphoma Father      Other (malignant melanoma of skin) Sister         There is no immunization history on file for this patient.  Current Outpatient Medications   Medication Instructions    amantadine (SYMMETREL) 100 mg, oral, Daily    calcium carb/magnesium ox,carb (DORIS-MAG ORAL) oral, Takes 4 capsules daily    carbidopa-levodopa (Sinemet)  mg tablet 2 tablets, oral, 3 times daily    cholecalciferol (Vitamin D-3) 10 mcg/mL (400 unit/mL) drops oral    cyclobenzaprine (Flexeril) 10 mg tablet     digestive enzymes combo no.7 (Superior Digestive Enzyme) capsule oral    enzymes,digestive (DIGESTIVE ENZYMES ORAL) Take as directed    lipase-protease-amylase 9,000-112,500- 112,500 unit capsule oral    magnesium oxide (Mag-Ox) 400 mg tablet oral    NON FORMULARY Collagen peptides<BR>Iron     oxyCODONE (Roxicodone) 5 mg immediate release tablet TAKE 1 TABLET BY MOUTH EVERY 6 HOURS AS NEEDED FOR SEVERE PAIN (PAIN SCALE 7 TO 10) FOR UP TO 3 DAYS.    pramipexole 1.5 mg tablet extended release 24 hr TAKE 1 TABLET (1.5 MG) BY MOUTH ONCE DAILY AT BEDTIME    rOPINIRole XL (REQUIP XL) 2 mg, oral, Nightly    traZODone (DESYREL) 100 mg, oral, Nightly    TURMERIC ORAL oral     Allergies   Allergen Reactions    Acetaminophen Unknown and Hives    Sulfamethoxazole-Trimethoprim Unknown and Hives       Objective   Vitals:    10/07/24 1422   BP: 130/68   BP Location: Left arm   Patient Position: Sitting   BP Cuff Size: Adult   Pulse: 74   Temp: 36.6 °C (97.8 °F)   SpO2: 99%   Weight: 70.3 kg (155 lb)   Height: 1.575 m (5' 2\")     Body mass index is 28.35 kg/m².    BP Readings from Last 3 Encounters:   10/07/24 130/68   06/24/24 141/72   04/09/24 108/68      Wt Readings from Last 3 Encounters:   10/07/24 70.3 kg (155 lb)   06/24/24 72.6 kg (160 lb)   04/09/24 73.5 kg (162 lb)        No visits with results within 1 Month(s) from this visit.   Latest known " visit with results is:   Lab on 08/14/2024   Component Date Value    WBC 08/14/2024 5.8     nRBC 08/14/2024 0.0     RBC 08/14/2024 4.71     Hemoglobin 08/14/2024 13.2     Hematocrit 08/14/2024 43.6     MCV 08/14/2024 93     MCH 08/14/2024 28.0     MCHC 08/14/2024 30.3 (L)     RDW 08/14/2024 13.7     Platelets 08/14/2024 300     Neutrophils % 08/14/2024 65.5     Immature Granulocytes %,* 08/14/2024 0.2     Lymphocytes % 08/14/2024 23.7     Monocytes % 08/14/2024 7.5     Eosinophils % 08/14/2024 1.9     Basophils % 08/14/2024 1.2     Neutrophils Absolute 08/14/2024 3.77     Immature Granulocytes Ab* 08/14/2024 0.01     Lymphocytes Absolute 08/14/2024 1.36     Monocytes Absolute 08/14/2024 0.43     Eosinophils Absolute 08/14/2024 0.11     Basophils Absolute 08/14/2024 0.07     Glucose 08/14/2024 91     Sodium 08/14/2024 140     Potassium 08/14/2024 4.3     Chloride 08/14/2024 105     Bicarbonate 08/14/2024 26     Anion Gap 08/14/2024 13     Urea Nitrogen 08/14/2024 34 (H)     Creatinine 08/14/2024 1.32 (H)     eGFR 08/14/2024 42 (L)     Calcium 08/14/2024 9.9     Albumin 08/14/2024 4.3     Alkaline Phosphatase 08/14/2024 73     Total Protein 08/14/2024 7.1     AST 08/14/2024 15     Bilirubin, Total 08/14/2024 0.5     ALT 08/14/2024 5 (L)     Cholesterol 08/14/2024 242 (H)     HDL-Cholesterol 08/14/2024 62.8     Cholesterol/HDL Ratio 08/14/2024 3.9     LDL Calculated 08/14/2024 163 (H)     VLDL 08/14/2024 16     Triglycerides 08/14/2024 79     Non HDL Cholesterol 08/14/2024 179 (H)     Vitamin B12 08/14/2024 791      Physical Exam  General: Pt is sitting up in chair. Appears well-nourished. In no acute distress.  HENT: TM clear bilaterally. Oropharynx without erythema or exudate.  Neck: No cervical lymphadenopathy. No thyromegaly. No carotid bruits.  CV: S1S2 normal. Regular rate and rhythm. No murmurs, rubs, or gallops.  Resp: Clear to auscultation in all lobes. Good aeration. No rales, rhonchi, or wheezes.  GI: Soft,  no tenderness to palpation. No organomegaly. No abdominal bruits.   Extremities: No lower extremity edema bilaterally.   Skin: Warm and dry. No rashes or bruising.   Psych: Appropriate responses and actions.     Assessment/Plan   Problem List Items Addressed This Visit       Age-related osteoporosis without current pathological fracture    Obstructive sleep apnea    Parkinson's disease (Multi)    Restless legs syndrome    Stage 3b chronic kidney disease (Multi)    Relevant Orders    POCT Albumin Random Urine docked device    POCT UA Automated manually resulted    Referral to Nephrology     Other Visit Diagnoses       Annual physical exam    -  Primary    Insomnia, unspecified type        Relevant Medications    traZODone (Desyrel) 50 mg tablet        Parkinson's with Dyskinesia  - Stable and following with Dr Page   - She had reduction in medications and had improvement in hallucinations.  - PT again     IAIN  - No CPAP due to dry eyes  - Better sleeping on side  - Trying to lose weight     RLS- controlled  - Iron due to lower ferritin--resolved   - Increase activity  - Apply arnica topically   -Ropinirole 2 mg currently, trying to come off of it  - Movement during the day decreases it.     Osteopenia  - Have not had any fractures. Lowest T score of -1.4 in the Left Femur  - Suggested Matt Chi for weight bearing exercise   - Repeat Bone density in 2025  - Taking Calcium/Vit D K 2     CKD stage 3B  - GFR 42  - Urine microalbumin done in the office and was abnormal with small amount of albumin noted  - US kidneys ordered--normal  - consult neprology    insomnia  - Start Trazodone 50 mg nightly and go to 100 mg if needed

## 2024-10-09 ENCOUNTER — TELEPHONE (OUTPATIENT)
Dept: PRIMARY CARE | Facility: CLINIC | Age: 74
End: 2024-10-09
Payer: MEDICARE

## 2024-10-09 LAB — BACTERIA UR CULT: NORMAL

## 2024-10-09 NOTE — TELEPHONE ENCOUNTER
----- Message from Dung Pineda sent at 10/9/2024 11:44 AM EDT -----  Let the patient know there was no UTI

## 2024-10-14 ENCOUNTER — TELEPHONE (OUTPATIENT)
Dept: PRIMARY CARE | Facility: CLINIC | Age: 74
End: 2024-10-14
Payer: MEDICARE

## 2024-10-14 NOTE — TELEPHONE ENCOUNTER
She did not report any dizziness. Just that her legs feel sore and weak. She did say she fell a couple times

## 2024-10-14 NOTE — TELEPHONE ENCOUNTER
Patient called stated the sleep medicine seems to be helping with sleep but said her legs feel weak and sore since starting it. Please advise

## 2024-10-29 ENCOUNTER — APPOINTMENT (OUTPATIENT)
Dept: NEUROLOGY | Facility: CLINIC | Age: 74
End: 2024-10-29
Payer: MEDICARE

## 2024-11-01 ENCOUNTER — TELEMEDICINE (OUTPATIENT)
Dept: NEUROLOGY | Facility: CLINIC | Age: 74
End: 2024-11-01
Payer: MEDICARE

## 2024-11-01 VITALS — WEIGHT: 141 LBS | HEIGHT: 62 IN | BODY MASS INDEX: 25.95 KG/M2

## 2024-11-01 DIAGNOSIS — G20.B1 PARKINSON'S DISEASE WITH DYSKINESIA WITHOUT FLUCTUATING MANIFESTATIONS: ICD-10-CM

## 2024-11-01 DIAGNOSIS — G20.A1 PARKINSON'S DISEASE WITHOUT DYSKINESIA, UNSPECIFIED WHETHER MANIFESTATIONS FLUCTUATE: Primary | ICD-10-CM

## 2024-11-01 RX ORDER — LEVODOPA 42 MG/1
CAPSULE RESPIRATORY (INHALATION)
Qty: 42 CAPSULE | Refills: 3 | Status: SHIPPED | OUTPATIENT
Start: 2024-11-01

## 2024-11-01 RX ORDER — CARBIDOPA AND LEVODOPA 25; 100 MG/1; MG/1
2 TABLET ORAL 3 TIMES DAILY
Qty: 270 TABLET | Refills: 3 | Status: SHIPPED | OUTPATIENT
Start: 2024-11-01

## 2024-11-01 ASSESSMENT — PAIN SCALES - GENERAL: PAINLEVEL_OUTOF10: 0-NO PAIN

## 2024-11-05 ENCOUNTER — HOSPITAL ENCOUNTER (OUTPATIENT)
Dept: RADIOLOGY | Facility: HOSPITAL | Age: 74
Discharge: HOME | End: 2024-11-05
Payer: MEDICARE

## 2024-11-05 DIAGNOSIS — N18.32 CHRONIC KIDNEY DISEASE, STAGE 3B (MULTI): ICD-10-CM

## 2024-11-05 PROCEDURE — 76770 US EXAM ABDO BACK WALL COMP: CPT

## 2024-11-05 PROCEDURE — 76770 US EXAM ABDO BACK WALL COMP: CPT | Performed by: RADIOLOGY

## 2024-11-21 ENCOUNTER — APPOINTMENT (OUTPATIENT)
Dept: NEUROLOGY | Facility: CLINIC | Age: 74
End: 2024-11-21
Payer: MEDICARE

## 2025-02-24 ENCOUNTER — APPOINTMENT (OUTPATIENT)
Facility: CLINIC | Age: 75
End: 2025-02-24
Payer: MEDICARE

## 2025-02-24 ENCOUNTER — TELEPHONE (OUTPATIENT)
Dept: PRIMARY CARE | Facility: CLINIC | Age: 75
End: 2025-02-24

## 2025-02-24 DIAGNOSIS — G47.00 INSOMNIA, UNSPECIFIED TYPE: ICD-10-CM

## 2025-02-24 RX ORDER — TRAZODONE HYDROCHLORIDE 50 MG/1
150 TABLET ORAL NIGHTLY
Qty: 270 TABLET | Refills: 3 | Status: SHIPPED | OUTPATIENT
Start: 2025-02-24 | End: 2026-02-24

## 2025-02-24 NOTE — TELEPHONE ENCOUNTER
Patient called wanted to know if trazodone can be increased. She is having issues with restless legs and not sleeping again. She did report taking an additional trazodone pill last night which did help

## 2025-03-10 ENCOUNTER — TELEPHONE (OUTPATIENT)
Dept: PRIMARY CARE | Facility: CLINIC | Age: 75
End: 2025-03-10
Payer: MEDICARE

## 2025-03-10 NOTE — TELEPHONE ENCOUNTER
Patient wanted to know if she can be weaned off trazodone. She said even on the highest dose, she is still being kept awake at night. Also having problems with ongoing constipation and has been using dulcolax. Do you have other suggestions?

## 2025-03-11 NOTE — TELEPHONE ENCOUNTER
Patient does see you on April 7 for her normal follow up. If you need ot see her in regards to sleep, would you like her in sooner, just for that? For the constipation she did agree to try miralax with dulcolax

## 2025-03-12 DIAGNOSIS — G20.B1 PARKINSON'S DISEASE WITH DYSKINESIA WITHOUT FLUCTUATING MANIFESTATIONS: Primary | ICD-10-CM

## 2025-03-12 DIAGNOSIS — G47.01 INSOMNIA DUE TO MEDICAL CONDITION: ICD-10-CM

## 2025-03-12 RX ORDER — CLONAZEPAM 0.5 MG/1
0.5 TABLET ORAL NIGHTLY
Qty: 7 TABLET | Refills: 0 | Status: SHIPPED | OUTPATIENT
Start: 2025-03-12 | End: 2025-03-19

## 2025-03-17 NOTE — TELEPHONE ENCOUNTER
Spoke to patient today, she has actually been trying B1 and magnesium which is helping. Hoang lstay on trazodone for the moment

## 2025-03-21 DIAGNOSIS — G20.B1 PARKINSON'S DISEASE WITH DYSKINESIA WITHOUT FLUCTUATING MANIFESTATIONS: ICD-10-CM

## 2025-03-21 RX ORDER — CARBIDOPA AND LEVODOPA 25; 100 MG/1; MG/1
2 TABLET ORAL 3 TIMES DAILY
Qty: 540 TABLET | Refills: 3 | Status: SHIPPED | OUTPATIENT
Start: 2025-03-21

## 2025-04-07 ENCOUNTER — APPOINTMENT (OUTPATIENT)
Dept: PRIMARY CARE | Facility: CLINIC | Age: 75
End: 2025-04-07
Payer: MEDICARE

## 2025-04-07 VITALS
RESPIRATION RATE: 16 BRPM | SYSTOLIC BLOOD PRESSURE: 126 MMHG | OXYGEN SATURATION: 97 % | WEIGHT: 148 LBS | HEIGHT: 62 IN | HEART RATE: 80 BPM | TEMPERATURE: 97.4 F | BODY MASS INDEX: 27.23 KG/M2 | DIASTOLIC BLOOD PRESSURE: 66 MMHG

## 2025-04-07 DIAGNOSIS — K59.04 CHRONIC IDIOPATHIC CONSTIPATION: ICD-10-CM

## 2025-04-07 DIAGNOSIS — G20.B1 PARKINSON'S DISEASE WITH DYSKINESIA WITHOUT FLUCTUATING MANIFESTATIONS: Primary | ICD-10-CM

## 2025-04-07 PROCEDURE — 3008F BODY MASS INDEX DOCD: CPT | Performed by: FAMILY MEDICINE

## 2025-04-07 PROCEDURE — 99214 OFFICE O/P EST MOD 30 MIN: CPT | Performed by: FAMILY MEDICINE

## 2025-04-07 PROCEDURE — 1123F ACP DISCUSS/DSCN MKR DOCD: CPT | Performed by: FAMILY MEDICINE

## 2025-04-07 PROCEDURE — G2211 COMPLEX E/M VISIT ADD ON: HCPCS | Performed by: FAMILY MEDICINE

## 2025-04-07 PROCEDURE — 1159F MED LIST DOCD IN RCRD: CPT | Performed by: FAMILY MEDICINE

## 2025-04-07 PROCEDURE — 1160F RVW MEDS BY RX/DR IN RCRD: CPT | Performed by: FAMILY MEDICINE

## 2025-04-07 NOTE — PROGRESS NOTES
"Subjective   Patient ID: Nikki Person is a 74 y.o. female who presents for Follow-up.    Past Medical, Surgical, and Family History reviewed and updated in chart.         HPI  In October  started [facility] issue that 1 hour before her Parkinson's med she gets \"yucky\"  she takes the medication and then better.  Was told by neurology to start new medication.  She was not able to afford the medication.    Started on laxative and taking 3 nghtly before bed. She would feel like had to go and then just strain.    1.  Physical exam.  Pap: last done   Mammogram: last done 2024  Colonoscopy: last done   Immunizations:   Walking x4 per week     Fall   -turned around and fell, trying to catch herself on the counter  -Due for a follow up xray this week  -Currently doing PT   -Naproxen currently, mainly at night      Hasty for sleep    Review of Systems  All pertinent positive symptoms are included in the history of present illness.    All other systems have been reviewed and are negative and noncontributory to this patient's current ailments.    Past Medical History:   Diagnosis Date    Other nondisplaced fracture of upper end of left humerus, sequela 2020    Other closed nondisplaced fracture of proximal end of left humerus, sequela    Personal history of other infectious and parasitic diseases 2019    History of scabies    Somnolence 2020    Daytime somnolence     Past Surgical History:   Procedure Laterality Date    OTHER SURGICAL HISTORY  2019    Cholecystectomy    OTHER SURGICAL HISTORY  2019    Hysterectomy     Social History     Tobacco Use    Smoking status: Former     Current packs/day: 0.00     Types: Cigarettes     Quit date:      Years since quittin.3    Smokeless tobacco: Never   Vaping Use    Vaping status: Never Used   Substance Use Topics    Alcohol use: Never    Drug use: Never     Family History   Problem Relation Name Age of Onset    Other (renal cell carcinoma) Mother   " "   Lymphoma Father      Other (malignant melanoma of skin) Sister         There is no immunization history on file for this patient.  Current Outpatient Medications   Medication Instructions    carbidopa-levodopa (Sinemet)  mg tablet 2 tablets, oral, 3 times daily    cholecalciferol (Vitamin D-3) 10 mcg/mL (400 unit/mL) drops Take by mouth.    levodopa (Inbrija) 42 mg capsule, w/inhalation device Take 2 caps prn for off phases up to 3 times daily    linaCLOtide (LINZESS) 145 mcg, oral, Daily before breakfast, Do not crush or chew.    magnesium oxide (Mag-Ox) 400 mg tablet Daily    NON FORMULARY Vitamin b1-150mg, good remedies gentle laxative,estro flavone     Allergies   Allergen Reactions    Acetaminophen Unknown and Hives    Sulfamethoxazole-Trimethoprim Unknown and Hives       Objective   Vitals:    04/07/25 1510   BP: 126/66   BP Location: Left arm   Patient Position: Sitting   BP Cuff Size: Adult   Pulse: 80   Resp: 16   Temp: 36.3 °C (97.4 °F)   SpO2: 97%   Weight: 67.1 kg (148 lb)   Height: 1.575 m (5' 2\")     Body mass index is 27.07 kg/m².    BP Readings from Last 3 Encounters:   04/07/25 126/66   10/07/24 130/68   06/24/24 141/72      Wt Readings from Last 3 Encounters:   04/07/25 67.1 kg (148 lb)   11/01/24 64 kg (141 lb)   10/07/24 70.3 kg (155 lb)        No visits with results within 1 Month(s) from this visit.   Latest known visit with results is:   Office Visit on 10/07/2024   Component Date Value    POC Color, Urine 10/07/2024 Yellow     POC Appearance, Urine 10/07/2024 Clear     POC Glucose, Urine 10/07/2024 NEGATIVE     POC Bilirubin, Urine 10/07/2024 NEGATIVE     POC Ketones, Urine 10/07/2024 NEGATIVE     POC Specific Gravity, Ur* 10/07/2024 1.020     POC Blood, Urine 10/07/2024 TRACE-Intact (A)     POC PH, Urine 10/07/2024 7.0     POC Protein, Urine 10/07/2024 NEGATIVE     POC Urobilinogen, Urine 10/07/2024 0.2     Poc Nitrite, Urine 10/07/2024 NEGATIVE     POC Leukocytes, Urine 10/07/2024 " TRACE (A)     Urine Culture 10/07/2024 No significant growth     POC Urine Albumin 10/07/2024 80     POC Urine Creatinine 10/07/2024 100     POC ALBUMIN /CREATININE * 10/07/2024 30 - 300 (A)      Physical Exam  General: Pt is sitting up in chair. Appears well-nourished. In no acute distress.  HENT: TM clear bilaterally. Oropharynx without erythema or exudate.  Neck: No cervical lymphadenopathy. No thyromegaly. No carotid bruits.  CV: S1S2 normal. Regular rate and rhythm. No murmurs, rubs, or gallops.  Resp: Clear to auscultation in all lobes. Good aeration. No rales, rhonchi, or wheezes.  GI: Soft, no tenderness to palpation. No organomegaly. No abdominal bruits.   Extremities: No lower extremity edema bilaterally.   Skin: Warm and dry. No rashes or bruising.   Psych: Appropriate responses and actions.     Assessment/Plan   Problem List Items Addressed This Visit       Parkinson's disease - Primary    Relevant Orders    Referral to Neurology     Other Visit Diagnoses       Chronic idiopathic constipation        Relevant Medications    linaCLOtide (Linzess) 145 mcg capsule          Parkinson's with Dyskinesia  - Stable and following with Dr Page   - She had reduction in medications and had improvement in hallucinations.  - PT again     IAIN  - No CPAP due to dry eyes  - Better sleeping on side  - Trying to lose weight     RLS- controlled  - Iron due to lower ferritin--resolved   - Increase activity  - Apply arnica topically   -Ropinirole 2 mg currently, trying to come off of it  - Movement during the day decreases it.   Start stretching calves and hamstrings.    Start Vitamin K 2  Start walking with help      Osteopenia  - Have not had any fractures. Lowest T score of -1.4 in the Left Femur  - Suggested Matt Chi for weight bearing exercise   - Repeat Bone density in 2025  - Taking Calcium/Vit D K 2     CKD stage 3B  - GFR 42  - Urine microalbumin done in the office and was abnormal with small amount of albumin  noted  - US kidneys ordered--normal  - nephrology    insomnia  - Trazodone failed   - Clonazepam             Constipation        - linzess ordered as she failed on doculax    - Instructed to use Miralax.

## 2025-04-10 ENCOUNTER — TELEPHONE (OUTPATIENT)
Dept: PRIMARY CARE | Facility: CLINIC | Age: 75
End: 2025-04-10
Payer: MEDICARE

## 2025-04-10 NOTE — TELEPHONE ENCOUNTER
Pt states she started on the linzess and she doesn't seem to be getting any better results than she did with dulcolax, she is having Nausea, has to strain before bowel movements and the bowel movement is all liquid, she is wondering about bowel blockage or what you might recommend?

## 2025-04-28 ENCOUNTER — TELEPHONE (OUTPATIENT)
Dept: PRIMARY CARE | Facility: CLINIC | Age: 75
End: 2025-04-28
Payer: MEDICARE

## 2025-04-28 DIAGNOSIS — G47.09 OTHER INSOMNIA: Primary | ICD-10-CM

## 2025-04-29 ENCOUNTER — APPOINTMENT (OUTPATIENT)
Dept: RADIOLOGY | Facility: HOSPITAL | Age: 75
End: 2025-04-29
Payer: MEDICARE

## 2025-04-29 RX ORDER — TRAZODONE HYDROCHLORIDE 50 MG/1
100 TABLET ORAL NIGHTLY PRN
Qty: 30 TABLET | Refills: 0 | Status: SHIPPED | OUTPATIENT
Start: 2025-04-29 | End: 2025-05-01

## 2025-04-30 DIAGNOSIS — G47.09 OTHER INSOMNIA: ICD-10-CM

## 2025-04-30 NOTE — TELEPHONE ENCOUNTER
Left detailed message, advised can resume miralax and take linzess as needed and trazodone was sent in  
Patient called for 2 things:    Patient has been taking linzess and while it is helping it is causing extreme nausea. Wanted to know if she shoulf go back on miralax  Wanted ot go back on trazodone, 50 mg 2 tabs nightly if this would be ok  
Emergent/ED

## 2025-05-01 RX ORDER — TRAZODONE HYDROCHLORIDE 50 MG/1
100 TABLET ORAL NIGHTLY
Qty: 180 TABLET | Refills: 3 | Status: SHIPPED | OUTPATIENT
Start: 2025-05-01 | End: 2026-05-01

## 2025-05-13 ENCOUNTER — OFFICE VISIT (OUTPATIENT)
Dept: PRIMARY CARE | Facility: CLINIC | Age: 75
End: 2025-05-13
Payer: MEDICARE

## 2025-05-13 VITALS
BODY MASS INDEX: 27.23 KG/M2 | OXYGEN SATURATION: 96 % | WEIGHT: 148 LBS | HEIGHT: 62 IN | DIASTOLIC BLOOD PRESSURE: 56 MMHG | HEART RATE: 70 BPM | TEMPERATURE: 97.3 F | SYSTOLIC BLOOD PRESSURE: 122 MMHG

## 2025-05-13 DIAGNOSIS — M25.551 CHRONIC RIGHT HIP PAIN: Primary | ICD-10-CM

## 2025-05-13 DIAGNOSIS — G89.29 CHRONIC RIGHT HIP PAIN: Primary | ICD-10-CM

## 2025-05-13 PROCEDURE — 1036F TOBACCO NON-USER: CPT | Performed by: FAMILY MEDICINE

## 2025-05-13 PROCEDURE — 1159F MED LIST DOCD IN RCRD: CPT | Performed by: FAMILY MEDICINE

## 2025-05-13 PROCEDURE — 1160F RVW MEDS BY RX/DR IN RCRD: CPT | Performed by: FAMILY MEDICINE

## 2025-05-13 PROCEDURE — G2211 COMPLEX E/M VISIT ADD ON: HCPCS | Performed by: FAMILY MEDICINE

## 2025-05-13 PROCEDURE — 99214 OFFICE O/P EST MOD 30 MIN: CPT | Performed by: FAMILY MEDICINE

## 2025-05-13 RX ORDER — BUSPIRONE HYDROCHLORIDE 5 MG/1
1 TABLET ORAL
COMMUNITY
Start: 2025-04-11

## 2025-05-13 ASSESSMENT — PATIENT HEALTH QUESTIONNAIRE - PHQ9
SUM OF ALL RESPONSES TO PHQ9 QUESTIONS 1 AND 2: 2
1. LITTLE INTEREST OR PLEASURE IN DOING THINGS: SEVERAL DAYS
2. FEELING DOWN, DEPRESSED OR HOPELESS: SEVERAL DAYS

## 2025-05-13 NOTE — PROGRESS NOTES
Subjective   Patient ID: Nikki Person is a 75 y.o. female who presents for Hip Pain (Having right hip pain for 3-4 months. /).  HPI  History of Present Illness  The patient presents for evaluation of right hip pain.    She reports experiencing pain in her right hip, which extends into the groin area. The pain is not present during ambulation or when transitioning from a seated to standing position. However, prolonged sitting or certain movements, such as lifting her leg to dress, trigger the discomfort. An episode of restless legs syndrome at night, characterized by a sudden leg jerk, also resulted in pain. She has attempted to alleviate the pain with over-the-counter medications and topical applications such as IcyHot and Biofreeze, but these have proven ineffective. The intensity of the pain varies, with some instances being particularly severe. On Friday night, she was laying on the couch and experienced significant pain, which was somewhat alleviated by using a heated rice pad. She alternates between sleeping in a lift chair and on the couch, depending on her ability to sleep on her side. She typically sleeps on her left side. The pain appears to be responsive to heat therapy.    Additionally, she reports experiencing drainage, which has led to choking episodes. This symptom has shown some improvement.     Results       Review of Systems    Objective      Physical Exam  Physical Exam  General: Patient is alert and orient x 3 and appears in no acute distress.  He is in pain distress.  Ataxic gait.    Eyes: EOMI, PERRLA    Heart: Regular rate and rhythm no murmur skips or gallops    Lungs: Clear auscultation bilateral, no rales or wheezing    Musculoskeletal: Pain over the greater trochanter with bursa on the left side. Pain noted over the actual joint of the right hip with certain maneuvers.  Minimal pain with internal rotation of the hip on the right, positive Dejuan  Assessment/Plan   Assessment & Plan  1.  Left hip pain.  - The etiology of the pain could be multifactorial, potentially involving arthritis or a labral tear. The presence of a tight psoas muscle may also contribute to the discomfort.  - The pain is localized to the hip joint upon palpation.   - A referral to an orthopedist will be made for further evaluation and potential treatment, which may include an injection to alleviate inflammation and pain. Physical therapy will be initiated as part of the treatment plan. X-rays of the affected area will be ordered to assess the severity of the condition.  - She is advised to continue with heat therapy and to consider using a heating pad for additional relief. She is recommended to try homeopathic Rhus tox, which can be taken before bedtime or upon waking if symptoms persist. This medication should be taken 15 minutes before or after meals, allowing the pellets to dissolve under the tongue. An MRI may be considered if necessary.    2. Drainage.  - She reports experiencing drainage, which has led to choking episodes.  - This symptom has shown some improvement.  - The drainage is thick and occurs primarily when she is lying down.  - Continue monitoring for any changes or worsening of symptoms.     Problem List Items Addressed This Visit    None      This medical note was created with the assistance of artificial intelligence (AI) for documentation purposes. The content has been reviewed and confirmed by the healthcare provider for accuracy and completeness. Patient consented to the use of audio recording and use of AI during their visit.

## 2025-05-15 ENCOUNTER — TELEPHONE (OUTPATIENT)
Dept: PRIMARY CARE | Facility: CLINIC | Age: 75
End: 2025-05-15
Payer: MEDICARE

## 2025-05-15 NOTE — TELEPHONE ENCOUNTER
Patient called she said hip pain is still really bad and feels worse. She did start the homeopathic treatment you suggested but wanted to know if she can take anything else for pain and about how long she should wait for homeopathic treatment to kick in

## 2025-05-23 ENCOUNTER — OFFICE VISIT (OUTPATIENT)
Dept: ORTHOPEDIC SURGERY | Facility: HOSPITAL | Age: 75
End: 2025-05-23
Payer: MEDICARE

## 2025-05-23 ENCOUNTER — HOSPITAL ENCOUNTER (OUTPATIENT)
Dept: RADIOLOGY | Facility: HOSPITAL | Age: 75
Discharge: HOME | End: 2025-05-23
Payer: MEDICARE

## 2025-05-23 VITALS — WEIGHT: 148 LBS | HEIGHT: 62 IN | BODY MASS INDEX: 27.23 KG/M2

## 2025-05-23 DIAGNOSIS — M25.551 CHRONIC RIGHT HIP PAIN: ICD-10-CM

## 2025-05-23 DIAGNOSIS — G89.29 CHRONIC RIGHT HIP PAIN: ICD-10-CM

## 2025-05-23 DIAGNOSIS — M16.11 PRIMARY OSTEOARTHRITIS OF RIGHT HIP: Primary | ICD-10-CM

## 2025-05-23 PROCEDURE — 1125F AMNT PAIN NOTED PAIN PRSNT: CPT

## 2025-05-23 PROCEDURE — 99212 OFFICE O/P EST SF 10 MIN: CPT

## 2025-05-23 PROCEDURE — 99204 OFFICE O/P NEW MOD 45 MIN: CPT

## 2025-05-23 PROCEDURE — 1036F TOBACCO NON-USER: CPT

## 2025-05-23 PROCEDURE — 1159F MED LIST DOCD IN RCRD: CPT

## 2025-05-23 PROCEDURE — 73502 X-RAY EXAM HIP UNI 2-3 VIEWS: CPT | Mod: RT

## 2025-05-23 ASSESSMENT — PAIN SCALES - GENERAL: PAINLEVEL_OUTOF10: 8

## 2025-05-23 ASSESSMENT — PAIN - FUNCTIONAL ASSESSMENT: PAIN_FUNCTIONAL_ASSESSMENT: 0-10

## 2025-05-23 NOTE — PROGRESS NOTES
PRIMARY CARE PHYSICIAN: Dung Pineda DO  REFERRING PROVIDER: Dung Pineda DO  [address] [place] [address]     CONSULT ORTHOPAEDIC: Hip Evaluation        ASSESSMENT & PLAN    IMPRESSION:  Right hip osteoarthritis    PLAN:  -Based on the history, physical exam and imaging studies above, the patient's presentation is consistent with the above diagnosis.  I had a long discussion with the patient regarding their presentation and the treatment options.  We discussed initial nonoperative versus operative management options as well as potential further diagnostic imaging.  We again discussed their treatment options going forward along with their associated risks and benefits. After thorough discussion, the patient has elected to proceed with conservative management. All questions were answered to the patients satisfaction who seems satisfied with the plan.  They will call the office with any questions/concerns.   -Referral for physical therapy was provided.  Patient like to do therapy in [place] where she is gone in the past.  -Continue Tylenol for pain control.  Also recommended combining this with topical Voltaren up to 4 times daily for pain control.  May continue to use ice/heat as well.  -Plan to follow-up in 6 weeks after completion of physical therapy and trial of Tylenol/Voltaren.  Patient does not get relief for symptoms by then, could discuss intra-articular corticosteroid injection with Dr. Wilder in the future.      SUBJECTIVE  CHIEF COMPLAINT:   Chief Complaint   Patient presents with    Right Hip - Pain        HPI: Nikki Person is a 75 y.o. patient. Nikki Person has had progressive problems with the right hip over the past 4 months. They do not report any trauma. They do not report any constant or progressive numbness or tingling in their legs. Their symptoms are interfering with activities which include being a from a seated position, climbing stairs.  Patient was seen evaluated by her  primary care provider who ordered x-rays and ordered physical therapy prior to her evaluation by orthopedics.    FUNCTIONAL STATUS: occasionally limited.  AMBULATORY STATUS: Independent community ambulation without devices  PREVIOUS TREATMENTS: NSAIDS ibuprofen with mild improvement  Heating pad with good improvement, icy hot with mild improvement, patient just began therapy per primary care recommendation  HISTORY OF SURGERY ON AFFECTED HIP(S): No   BACK PAIN REPORTED: No       REVIEW OF SYSTEMS  Constitutional: See HPI for pain assessment, No significant weight loss, recent trauma  Cardiovascular: No chest pain, shortness of breath  Respiratory: No difficulty breathing, cough  Gastrointestinal: No nausea, vomiting, diarrhea, constipation  Musculoskeletal: Noted in HPI, positive for pain, restricted motion, stiffness  Integumentary: No rashes, easy bruising, redness   Neurological: no numbness or tingling in extremities, no gait disturbances   Psychiatric: No mood changes, memory changes, social issues  Heme/Lymph: no excessive swelling, easy bruising, excessive bleeding  ENT: No hearing changes  Eyes: No vision changes    Medical History[1]     Allergies[2]     Surgical History[3]     Family History[4]     Social History     Socioeconomic History    Marital status:      Spouse name: Not on file    Number of children: Not on file    Years of education: Not on file    Highest education level: Not on file   Occupational History    Not on file   Tobacco Use    Smoking status: Former     Current packs/day: 0.00     Types: Cigarettes     Quit date:      Years since quittin.4    Smokeless tobacco: Never   Vaping Use    Vaping status: Never Used   Substance and Sexual Activity    Alcohol use: Never    Drug use: Never    Sexual activity: Not on file   Other Topics Concern    Not on file   Social History Narrative    Not on file     Social Drivers of Health     Financial Resource Strain: Not on file   Food  "Insecurity: Not on file   Transportation Needs: Not on file   Physical Activity: Not on file   Stress: Not on file   Social Connections: Not on file   Intimate Partner Violence: Not on file   Housing Stability: Not on file        CURRENT MEDICATIONS:   Current Medications[5]     OBJECTIVE    PHYSICAL EXAM      6/24/2024     1:45 PM 6/24/2024     1:51 PM 10/7/2024     2:22 PM 11/1/2024    10:29 AM 4/7/2025     3:10 PM 5/13/2025     9:56 AM 5/23/2025    10:45 AM   Vitals   Systolic 158 141 130  126 122    Diastolic 79 72 68  66 56    BP Location Right arm  Left arm  Left arm Left arm    Heart Rate 87  74  80 70    Temp 36.4 °C (97.5 °F)  36.6 °C (97.8 °F)  36.3 °C (97.4 °F) 36.3 °C (97.3 °F)    Resp 18    16     Height 1.575 m (5' 2\")  1.575 m (5' 2\") 1.575 m (5' 2\") 1.575 m (5' 2\") 1.575 m (5' 2\") 1.575 m (5' 2\")   Weight (lb) 160  155 141 148 148 148   BMI 29.26 kg/m2  28.35 kg/m2 25.79 kg/m2 27.07 kg/m2 27.07 kg/m2 27.07 kg/m2   BSA (m2) 1.78 m2  1.75 m2 1.67 m2 1.71 m2 1.71 m2 1.71 m2   Visit Report Report Report Report  Report Report Report      Body mass index is 27.07 kg/m².    GENERAL: A/Ox3, NAD. Appears healthy, well nourished  PSYCHIATRIC: Mood stable, appropriate memory recall  EYES: EOM intact  CARDIAC: regular rate  LUNGS: Breathing non-labored  SKIN: no erythema, rashes, or ecchymoses     MUSCULOSKELETAL:  Laterality: right Hip Exam  - ROM, Extension: full, no flexion contracture  - Strength: Abduction 5/5 against resitance, Flexion 5/5  - Palpation: non  TTP along greater trochanter  - Log roll/IR exam: painful and limited motion. Pain with hip flexion past 90 degrees and internal rotation  - Straight leg raise: negative  - EHL/PF/DF motor intact  - Gait: antalgic to arthritic side, negative Trendelenburg gait   - Special Tests: none performed    NEUROVASCULAR:  - Neurovascular Status: sensation intact to light touch distally  - Capillary refill brisk at extremities, Bilateral dorsalis pedis pulse " 2+          IMAGING:  Multiple views of the affected right hip(s) demonstrate: Mild to moderate joint space narrowing of the right hip with subchondral sclerosis and cyst formation.  Mild to moderate joint space narrowing of the visualized portion of the left hip joint as well.  No acute osseous abnormality such as fracture or dislocation..   X-rays were personally reviewed and interpreted by me.  Radiology reports were reviewed by me as well, if readily available at the time.        Madelaine Gibbons PA-C  Physician Assistant  Orthopedic Surgery  [facility]       [1]   Past Medical History:  Diagnosis Date    Other nondisplaced fracture of upper end of left humerus, sequela 01/14/2020    Other closed nondisplaced fracture of proximal end of left humerus, sequela    Personal history of other infectious and parasitic diseases 12/17/2019    History of scabies    Somnolence 06/16/2020    Daytime somnolence   [2]   Allergies  Allergen Reactions    Acetaminophen Unknown and Hives    Sulfamethoxazole-Trimethoprim Unknown and Hives   [3]   Past Surgical History:  Procedure Laterality Date    OTHER SURGICAL HISTORY  12/17/2019    Cholecystectomy    OTHER SURGICAL HISTORY  12/17/2019    Hysterectomy   [4]   Family History  Problem Relation Name Age of Onset    Other (renal cell carcinoma) Mother      Lymphoma Father      Other (malignant melanoma of skin) Sister     [5]   Current Outpatient Medications   Medication Sig Dispense Refill    busPIRone (Buspar) 5 mg tablet Take 1 tablet (5 mg) by mouth every 12 hours.      carbidopa-levodopa (Sinemet)  mg tablet TAKE 2 TABLETS BY MOUTH 3 TIMES  DAILY 540 tablet 3    cholecalciferol (Vitamin D-3) 10 mcg/mL (400 unit/mL) drops Take by mouth.      levodopa (Inbrija) 42 mg capsule, w/inhalation device Take 2 caps prn for off phases up to 3 times daily 42 capsule 3    linaCLOtide (Linzess) 145 mcg capsule Take 1 capsule (145 mcg) by mouth once  daily in the morning. Take before meals. Do not crush or chew. (Patient not taking: Reported on 5/13/2025) 30 capsule 11    magnesium oxide (Mag-Ox) 400 mg tablet Take by mouth once daily.      NON FORMULARY Vitamin b1-150mg, good remedies gentle laxative,estro flavone      NON FORMULARY once daily at bedtime. Osteo nutrients ll   takes 4 at bedtime      traZODone (Desyrel) 50 mg tablet Take 2 tablets (100 mg) by mouth once daily at bedtime. 180 tablet 3     No current facility-administered medications for this visit.

## 2025-05-23 NOTE — PROGRESS NOTES
New patient - right hip pain - radiates from the side all the way to her knee  Hurts worse when sitting   No injury / no surgery  Xrays done today

## 2025-06-03 ENCOUNTER — TELEPHONE (OUTPATIENT)
Dept: ORTHOPEDIC SURGERY | Facility: CLINIC | Age: 75
End: 2025-06-03
Payer: MEDICARE

## 2025-06-03 NOTE — TELEPHONE ENCOUNTER
[facility] needs the order to say evaluation and treatment, not consultation. They also need the order to be physically signed. Also a demographic sheet needs to be sent with the new order. Fax# [phone]

## 2025-06-04 DIAGNOSIS — G89.29 CHRONIC RIGHT HIP PAIN: ICD-10-CM

## 2025-06-04 DIAGNOSIS — M25.551 CHRONIC RIGHT HIP PAIN: ICD-10-CM

## 2025-06-04 DIAGNOSIS — M16.11 PRIMARY OSTEOARTHRITIS OF RIGHT HIP: ICD-10-CM

## 2025-06-04 NOTE — TELEPHONE ENCOUNTER
"New order placed with \"evaluation and treatment\" - printed for Madelaine to sign tomorrow while in clinic and then will fax to number listed below.   " PAST SURGICAL HISTORY:  CAD (coronary artery disease) of artery bypass graft     History of total hip replacement, bilateral     S/P total knee replacement

## 2025-06-05 ENCOUNTER — TELEPHONE (OUTPATIENT)
Dept: PRIMARY CARE | Facility: CLINIC | Age: 75
End: 2025-06-05
Payer: MEDICARE

## 2025-06-05 NOTE — TELEPHONE ENCOUNTER
Yuan for pt to make sure she received the message on my chart, advised that she can either respond through [facility] [employer] or call us back.

## 2025-06-05 NOTE — TELEPHONE ENCOUNTER
I called Nikki a few times and no answer. I sent her a [facility] message too. I mailed her results out to her and let her know if she had any concerns to call us.

## 2025-06-05 NOTE — TELEPHONE ENCOUNTER
----- Message from Dung Pineda sent at 5/26/2025  6:15 PM EDT -----  Mild degenerative changes of the hips and the pubic symphysis. Chondrocalcinosis in the pubic symphysis as well.  ----- Message -----  From: Interface, Radiology Results In  Sent: 5/24/2025   8:24 AM EDT  To: Dung Pineda, DO

## 2025-07-03 ENCOUNTER — APPOINTMENT (OUTPATIENT)
Dept: ORTHOPEDIC SURGERY | Facility: HOSPITAL | Age: 75
End: 2025-07-03
Payer: MEDICARE

## 2025-07-03 ENCOUNTER — APPOINTMENT (OUTPATIENT)
Facility: CLINIC | Age: 75
End: 2025-07-03
Payer: MEDICARE

## 2025-08-01 ENCOUNTER — EVALUATION (OUTPATIENT)
Dept: PHYSICAL THERAPY | Facility: HOSPITAL | Age: 75
End: 2025-08-01
Payer: MEDICARE

## 2025-08-01 DIAGNOSIS — R26.89 IMBALANCE: Primary | ICD-10-CM

## 2025-08-01 DIAGNOSIS — R26.2 DIFFICULTY WALKING: ICD-10-CM

## 2025-08-01 DIAGNOSIS — G20.B1 PARKINSON'S DISEASE WITH DYSKINESIA, UNSPECIFIED WHETHER MANIFESTATIONS FLUCTUATE: ICD-10-CM

## 2025-08-01 PROCEDURE — 97162 PT EVAL MOD COMPLEX 30 MIN: CPT | Mod: GP | Performed by: PHYSICAL THERAPIST

## 2025-08-01 PROCEDURE — 97112 NEUROMUSCULAR REEDUCATION: CPT | Mod: GP | Performed by: PHYSICAL THERAPIST

## 2025-08-01 ASSESSMENT — PATIENT HEALTH QUESTIONNAIRE - PHQ9
SUM OF ALL RESPONSES TO PHQ9 QUESTIONS 1 AND 2: 2
5. POOR APPETITE OR OVEREATING: MORE THAN HALF THE DAYS
4. FEELING TIRED OR HAVING LITTLE ENERGY: NEARLY EVERY DAY
6. FEELING BAD ABOUT YOURSELF - OR THAT YOU ARE A FAILURE OR HAVE LET YOURSELF OR YOUR FAMILY DOWN: NOT AT ALL
7. TROUBLE CONCENTRATING ON THINGS, SUCH AS READING THE NEWSPAPER OR WATCHING TELEVISION: NOT AT ALL
2. FEELING DOWN, DEPRESSED OR HOPELESS: SEVERAL DAYS
1. LITTLE INTEREST OR PLEASURE IN DOING THINGS: SEVERAL DAYS
8. MOVING OR SPEAKING SO SLOWLY THAT OTHER PEOPLE COULD HAVE NOTICED. OR THE OPPOSITE, BEING SO FIGETY OR RESTLESS THAT YOU HAVE BEEN MOVING AROUND A LOT MORE THAN USUAL: SEVERAL DAYS
3. TROUBLE FALLING OR STAYING ASLEEP OR SLEEPING TOO MUCH: MORE THAN HALF THE DAYS
SUM OF ALL RESPONSES TO PHQ QUESTIONS 1-9: 10
9. THOUGHTS THAT YOU WOULD BE BETTER OFF DEAD, OR OF HURTING YOURSELF: NOT AT ALL

## 2025-08-01 ASSESSMENT — ENCOUNTER SYMPTOMS
OCCASIONAL FEELINGS OF UNSTEADINESS: 1
LOSS OF SENSATION IN FEET: 1
DEPRESSION: 1

## 2025-08-01 ASSESSMENT — PAIN SCALES - GENERAL: PAINLEVEL_OUTOF10: 0 - NO PAIN

## 2025-08-01 ASSESSMENT — PAIN - FUNCTIONAL ASSESSMENT: PAIN_FUNCTIONAL_ASSESSMENT: 0-10

## 2025-08-01 NOTE — PROGRESS NOTES
Physical Therapy    Physical Therapy Lower Neuro Evaluation    Patient Name: Nikki Person  : 1950  MRN: [id number]  Today's Date: 2025  Time Calculation  Start Time: 1352  Stop Time: 1451  Time Calculation (min): 59 min  PT Evaluation Time Entry  PT Evaluation (Moderate) Time Entry: 30  PT Therapeutic Procedures Time Entry  Neuromuscular Re-Education Time Entry: 29             Visit Number: 1  Auth: Auth required    Current Problem  Problem List Items Addressed This Visit           ICD-10-CM    Parkinson's disease G20.A1    Relevant Orders    Follow Up In Physical Therapy    Imbalance - Primary R26.89    Relevant Orders    Follow Up In Physical Therapy    Difficulty walking R26.2    Relevant Orders    Follow Up In Physical Therapy        General  Name and  confirmed with patient on this date.  Reason for Referral: Imbalance due to Parkinson's disease  Referred By: Dr. Cabral  Past Medical History Relevant to Rehab: Parkinson's disease, broken R shoulder  Preferred Learning Style: verbal    Ambulatory Screening Summary     Screening  Frequency  Date Last Completed   Spiritual and Cultural Beliefs   Screening  each visit or episode of care 2025   Falls Risk Screening  every ambulatory visit 2025  2:01 PM   Pain Screening  annually at primary care visit  2024   Domestic Violence screening  annually at primary care visit 2025   Elder Abuse Screening  annually at primary care visit    Depression Screening  annually in the primary care setting 2025   Suicide Risk Screening  annually in the primary care setting    Nutrition and Food Insecurity   Screening  at least annually at primary care visit     Key Learner  annually in the primary care setting 2025   Drug Screen  2025 10:45 AM   Alcohol Screen  2025 10:45 AM   Advance Directive  2024         Precautions  Precautions  STEADI Fall Risk Score (The score of 4 or more indicates an increased risk of falling):  11  Precautions Comment: high fall risk     Pain  Pain Assessment: 0-10  0-10 (Numeric) Pain Score: 0 - No pain    SUBJECTIVE:   Chief complaint:  Imbalance due to Parkinson's disease  Pt presents amb w/o assistive device today, stating she forgot it at home. Reports she was diagnosed with Parkinson's disease in 2008 and symptoms have worsened over time. She falls frequently and states she's fallen 3x since seeing the Dr in April. She often has LOB with turning, but reports not falling when walking forward. Reports breaking her R arm 1 year ago and the L arm 2 years ago from falling. States the R arm never healed and is limited with her mobility. States she uses a rollator walker at home, but presents amb w/o any device today. States she only uses the walker at home and holds onto her  when in the community. She reports after prolonged sitting she feels unsteady upon standing up. Pt reports her  assists with ADL's and transfers in/out of the tub shower and uses a shower seat. Pt sleeps in an adjustable bed and sometimes needs 's assist with bed mobility. States she walks from bed to the bathroom w/o rollator.    Prior level of function: Indep w/ADL's  Current limitations: Imbalance, gait difficulty  Home setup: Pt lives w/  Patients goal: To decrease falling    OBJECTIVE:    Observation: Increased tremors in Shree UE and LE's, flat affect, mask facial expression    UE Strength Screening:   RUE LUE   Sh Flex 3+,4-/5 4,4+/5   Sh Abd 3+,4-/5 4/5   Elbow Flex 4,4+/5 4+/5   Elbow Ext 4,4+/5 4,4+/5     LE Strength Screening:   RLE LLE   Hip Flex 4,4+/5 4,4+/5   Hip ABD 4,4+/5 4,4+/5   Hip ADD 5/5 5/5   Knee Ext 5/5 5/5   Knee Flex 5/5 5/5   Ankle DF 4+/5 4+/5   Ankle PF 5/5 5/5      Coordination:   - Finger to Nose: WNL Shree  - Finger Mario:  R WNL, L decreased  - Heel to Muñoz:  WNL Shree    Functional Mobility Assessment:  - Gait: Pt amb w/slightly festinating gait pattern, decreased step length  Shree, reduced floor clearance and slow linda. No arm swing noted  - Transfers:  Slow sit-stand; instability with SPT  - Bed Mobility: Difficult and needs assist  Pt sits in a chair w/L lateral trunk lean    Outcomes:  TU.44 seconds, 5xSTS:  25.27 seconds, and FGA:       TREATMENT:  Education in gait training     ASSESSEMENT  Pt is a 75 y.o. referred to physical therapy for a dx of   Problem List Items Addressed This Visit           ICD-10-CM    Parkinson's disease G20.A1    Relevant Orders    Follow Up In Physical Therapy    Imbalance - Primary R26.89    Relevant Orders    Follow Up In Physical Therapy    Difficulty walking R26.2    Relevant Orders    Follow Up In Physical Therapy    by David Cabral DO . Pt presents with worsening balance and walking as Parkinson's progresses. This pt would benefit from a therapy program to restore prior level of function, improve safety with mobility, increase AROM, increase strength, and improve gait pattern and static/dynamic balance.     The physical therapy prognosis is good for the patient to achieve their goals.   The pt tolerated therapy treatment today well with no adverse effects.  Barriers to therapy include:  Transportation    PLAN  OP PT Plan  Treatment/Interventions: Education/ Instruction, Manual therapy, Neuromuscular re-education, Therapeutic activities, Therapeutic exercises  PT Plan: Skilled PT  Rehab Potential: Good  Plan of Care Agreement: Patient    The pt has been educated about the risks and benefits of physical therapy and gives consent for treatment.     The patient will benefit from physical therapy treatment to include: Treatment/Interventions: Education/ Instruction, Manual therapy, Neuromuscular re-education, Therapeutic activities, Therapeutic exercises    Goals:  Active       Parkinson's Disease - Imbalance       1) Pt will decrease in TUG score to </=10 seconds to improve safety and mobility, with transfers and gait, overcoming effects  of PD        Start:  08/01/25    Expected End:  09/26/25            2) Pt will have a decrease in 5x sit to stand score to </=15 seconds to improve ease of safe mobility       Start:  08/01/25    Expected End:  09/26/25            3) Pt to improve dynamic balance with increased FGA score to >/=20/30 to reduce her fall risk with ADL's/IADL's        Start:  08/01/25    Expected End:  09/26/25            4) Pt to report no falls within 8 weeks to indicate increased safety with ADL performance       Start:  08/01/25    Expected End:  09/26/25            5) Pt to amb Mod Indep-Indep w/most appropriate device vs no device >200ft with normalize gait pattern and no LOB to improve safety with community participation       Start:  08/01/25    Expected End:  09/26/25              Insurance Authorization Information  Date of Evaluation: 8/1/2025    Onset Date: 7/15/2025    Referring Physician: David Cabral     Surgery in the Last 3 months:  no    CPT Codes: ALLOWED CPT CODES: THEREX (12898), THERE-ACT (36378), NMR  (83257), MANUAL (39187), and GAIT (49367)    Diagnosis:   Problem List Items Addressed This Visit           ICD-10-CM    Parkinson's disease G20.A1    Relevant Orders    Follow Up In Physical Therapy    Imbalance - Primary R26.89    Relevant Orders    Follow Up In Physical Therapy    Difficulty walking R26.2    Relevant Orders    Follow Up In Physical Therapy        Functional Outcome:     FGA - Functional Gait Assessment  Gait level surface: 1  Change in gait speed: 2  Gait with horizontal head turns: 2  Gait with vertical head turns: 1  Gait and pivot turn: 1  Step over obstacle: 1  Gait with narrow base of support: 0  Gait with eyes closed: 1  Ambulating backwards: 1  Steps: 2  FGA Total Score: 12    TUG Manual: 11.44 seconds      Other Measures  5x Sit to Stand: 25.27 seconds  Activities - Specific Balance Confidence Scale: 70 (1120 raw score)    OT / PT Evaluation complexity:  moderate    Which of the following  best describes the primary reason of therapy: Improving, restoring, or adapting functional mobility or skills    Visits Requested: 16    Date Range: 90 days    Select all conditions that apply: None of these apply     Miya Watson, PT

## 2025-08-29 ENCOUNTER — TREATMENT (OUTPATIENT)
Dept: PHYSICAL THERAPY | Facility: HOSPITAL | Age: 75
End: 2025-08-29
Payer: MEDICARE

## 2025-08-29 DIAGNOSIS — G20.B1 PARKINSON'S DISEASE WITH DYSKINESIA, UNSPECIFIED WHETHER MANIFESTATIONS FLUCTUATE: ICD-10-CM

## 2025-08-29 DIAGNOSIS — R26.89 IMBALANCE: ICD-10-CM

## 2025-08-29 DIAGNOSIS — R26.2 DIFFICULTY WALKING: ICD-10-CM

## 2025-08-29 PROCEDURE — 97110 THERAPEUTIC EXERCISES: CPT | Mod: GP | Performed by: PHYSICAL THERAPIST

## 2025-08-29 PROCEDURE — 97112 NEUROMUSCULAR REEDUCATION: CPT | Mod: GP | Performed by: PHYSICAL THERAPIST

## 2025-10-07 ENCOUNTER — APPOINTMENT (OUTPATIENT)
Dept: PRIMARY CARE | Facility: CLINIC | Age: 75
End: 2025-10-07
Payer: MEDICARE